# Patient Record
Sex: MALE | Race: WHITE | NOT HISPANIC OR LATINO | URBAN - METROPOLITAN AREA
[De-identification: names, ages, dates, MRNs, and addresses within clinical notes are randomized per-mention and may not be internally consistent; named-entity substitution may affect disease eponyms.]

---

## 2019-10-27 ENCOUNTER — HOSPITAL ENCOUNTER (INPATIENT)
Facility: HOSPITAL | Age: 44
LOS: 2 days | Discharge: LEFT BEFORE DISCHARGE | DRG: 304 | End: 2019-10-30
Attending: EMERGENCY MEDICINE | Admitting: EMERGENCY MEDICINE
Payer: MEDICARE

## 2019-10-27 DIAGNOSIS — R07.9 CHEST PAIN: ICD-10-CM

## 2019-10-27 DIAGNOSIS — R07.9 CHEST PAIN, UNSPECIFIED TYPE: Primary | ICD-10-CM

## 2019-10-27 DIAGNOSIS — N18.6 ESRD (END STAGE RENAL DISEASE): ICD-10-CM

## 2019-10-27 DIAGNOSIS — D64.9 ANEMIA, UNSPECIFIED TYPE: ICD-10-CM

## 2019-10-27 DIAGNOSIS — I10 HYPERTENSION, UNSPECIFIED TYPE: ICD-10-CM

## 2019-10-27 DIAGNOSIS — I16.0 HYPERTENSIVE URGENCY: ICD-10-CM

## 2019-10-27 DIAGNOSIS — D63.8 ANEMIA OF CHRONIC DISEASE: ICD-10-CM

## 2019-10-27 PROBLEM — E11.9 DIABETES MELLITUS, TYPE II: Status: ACTIVE | Noted: 2019-10-27

## 2019-10-27 PROBLEM — K31.84 GASTROPARESIS: Status: ACTIVE | Noted: 2019-10-27

## 2019-10-27 LAB
ABO + RH BLD: NORMAL
ALBUMIN SERPL BCP-MCNC: 3.6 G/DL (ref 3.5–5.2)
ALP SERPL-CCNC: 58 U/L (ref 55–135)
ALT SERPL W/O P-5'-P-CCNC: 6 U/L (ref 10–44)
ANION GAP SERPL CALC-SCNC: 17 MMOL/L (ref 8–16)
AST SERPL-CCNC: 10 U/L (ref 10–40)
BASOPHILS # BLD AUTO: 0.03 K/UL (ref 0–0.2)
BASOPHILS NFR BLD: 0.5 % (ref 0–1.9)
BILIRUB SERPL-MCNC: 0.8 MG/DL (ref 0.1–1)
BLD GP AB SCN CELLS X3 SERPL QL: NORMAL
BLD PROD TYP BPU: NORMAL
BLOOD UNIT EXPIRATION DATE: NORMAL
BLOOD UNIT TYPE CODE: 6200
BLOOD UNIT TYPE: NORMAL
BNP SERPL-MCNC: 2734 PG/ML (ref 0–99)
BUN SERPL-MCNC: 51 MG/DL (ref 6–20)
CALCIUM SERPL-MCNC: 9.3 MG/DL (ref 8.7–10.5)
CHLORIDE SERPL-SCNC: 97 MMOL/L (ref 95–110)
CO2 SERPL-SCNC: 24 MMOL/L (ref 23–29)
CODING SYSTEM: NORMAL
CREAT SERPL-MCNC: 10 MG/DL (ref 0.5–1.4)
DIFFERENTIAL METHOD: ABNORMAL
DISPENSE STATUS: NORMAL
EOSINOPHIL # BLD AUTO: 0.3 K/UL (ref 0–0.5)
EOSINOPHIL NFR BLD: 5.9 % (ref 0–8)
ERYTHROCYTE [DISTWIDTH] IN BLOOD BY AUTOMATED COUNT: 13.9 % (ref 11.5–14.5)
EST. GFR  (AFRICAN AMERICAN): 7 ML/MIN/1.73 M^2
EST. GFR  (NON AFRICAN AMERICAN): 6 ML/MIN/1.73 M^2
GLUCOSE SERPL-MCNC: 240 MG/DL (ref 70–110)
HCT VFR BLD AUTO: 22.1 % (ref 40–54)
HGB BLD-MCNC: 7.3 G/DL (ref 14–18)
IMM GRANULOCYTES # BLD AUTO: 0.02 K/UL (ref 0–0.04)
IMM GRANULOCYTES NFR BLD AUTO: 0.4 % (ref 0–0.5)
LIPASE SERPL-CCNC: 47 U/L (ref 4–60)
LYMPHOCYTES # BLD AUTO: 0.9 K/UL (ref 1–4.8)
LYMPHOCYTES NFR BLD: 15.5 % (ref 18–48)
MCH RBC QN AUTO: 29.4 PG (ref 27–31)
MCHC RBC AUTO-ENTMCNC: 33 G/DL (ref 32–36)
MCV RBC AUTO: 89 FL (ref 82–98)
MONOCYTES # BLD AUTO: 0.3 K/UL (ref 0.3–1)
MONOCYTES NFR BLD: 5.3 % (ref 4–15)
NEUTROPHILS # BLD AUTO: 4 K/UL (ref 1.8–7.7)
NEUTROPHILS NFR BLD: 72.4 % (ref 38–73)
NRBC BLD-RTO: 0 /100 WBC
NUM UNITS TRANS PACKED RBC: NORMAL
PLATELET # BLD AUTO: 140 K/UL (ref 150–350)
PMV BLD AUTO: 10 FL (ref 9.2–12.9)
POCT GLUCOSE: 220 MG/DL (ref 70–110)
POTASSIUM SERPL-SCNC: 4.4 MMOL/L (ref 3.5–5.1)
PROT SERPL-MCNC: 7.1 G/DL (ref 6–8.4)
RBC # BLD AUTO: 2.48 M/UL (ref 4.6–6.2)
SODIUM SERPL-SCNC: 138 MMOL/L (ref 136–145)
TROPONIN I SERPL DL<=0.01 NG/ML-MCNC: 0.1 NG/ML (ref 0–0.03)
WBC # BLD AUTO: 5.47 K/UL (ref 3.9–12.7)

## 2019-10-27 PROCEDURE — 25000003 PHARM REV CODE 250: Performed by: EMERGENCY MEDICINE

## 2019-10-27 PROCEDURE — 80053 COMPREHEN METABOLIC PANEL: CPT

## 2019-10-27 PROCEDURE — P9016 RBC LEUKOCYTES REDUCED: HCPCS

## 2019-10-27 PROCEDURE — 96375 TX/PRO/DX INJ NEW DRUG ADDON: CPT

## 2019-10-27 PROCEDURE — 94761 N-INVAS EAR/PLS OXIMETRY MLT: CPT

## 2019-10-27 PROCEDURE — 99204 PR OFFICE/OUTPT VISIT, NEW, LEVL IV, 45-59 MIN: ICD-10-PCS | Mod: ,,, | Performed by: INTERNAL MEDICINE

## 2019-10-27 PROCEDURE — 85025 COMPLETE CBC W/AUTO DIFF WBC: CPT

## 2019-10-27 PROCEDURE — 63600175 PHARM REV CODE 636 W HCPCS: Performed by: NURSE PRACTITIONER

## 2019-10-27 PROCEDURE — 93010 ELECTROCARDIOGRAM REPORT: CPT | Mod: ,,, | Performed by: INTERNAL MEDICINE

## 2019-10-27 PROCEDURE — G0378 HOSPITAL OBSERVATION PER HR: HCPCS

## 2019-10-27 PROCEDURE — 83690 ASSAY OF LIPASE: CPT

## 2019-10-27 PROCEDURE — 99204 OFFICE O/P NEW MOD 45 MIN: CPT | Mod: ,,, | Performed by: INTERNAL MEDICINE

## 2019-10-27 PROCEDURE — 86920 COMPATIBILITY TEST SPIN: CPT

## 2019-10-27 PROCEDURE — 83880 ASSAY OF NATRIURETIC PEPTIDE: CPT

## 2019-10-27 PROCEDURE — 93005 ELECTROCARDIOGRAM TRACING: CPT

## 2019-10-27 PROCEDURE — 93010 EKG 12-LEAD: ICD-10-PCS | Mod: ,,, | Performed by: INTERNAL MEDICINE

## 2019-10-27 PROCEDURE — 96372 THER/PROPH/DIAG INJ SC/IM: CPT

## 2019-10-27 PROCEDURE — 84484 ASSAY OF TROPONIN QUANT: CPT

## 2019-10-27 PROCEDURE — 99291 CRITICAL CARE FIRST HOUR: CPT | Mod: 25

## 2019-10-27 PROCEDURE — 63600175 PHARM REV CODE 636 W HCPCS: Performed by: EMERGENCY MEDICINE

## 2019-10-27 PROCEDURE — 36430 TRANSFUSION BLD/BLD COMPNT: CPT

## 2019-10-27 PROCEDURE — 25000003 PHARM REV CODE 250: Performed by: NURSE PRACTITIONER

## 2019-10-27 PROCEDURE — 86850 RBC ANTIBODY SCREEN: CPT

## 2019-10-27 PROCEDURE — S0028 INJECTION, FAMOTIDINE, 20 MG: HCPCS | Performed by: NURSE PRACTITIONER

## 2019-10-27 PROCEDURE — 96376 TX/PRO/DX INJ SAME DRUG ADON: CPT

## 2019-10-27 RX ORDER — TRAMADOL HYDROCHLORIDE 50 MG/1
50 TABLET ORAL EVERY 6 HOURS PRN
Status: DISCONTINUED | OUTPATIENT
Start: 2019-10-27 | End: 2019-10-30 | Stop reason: HOSPADM

## 2019-10-27 RX ORDER — HYDRALAZINE HYDROCHLORIDE 20 MG/ML
10 INJECTION INTRAMUSCULAR; INTRAVENOUS EVERY 6 HOURS PRN
Status: DISCONTINUED | OUTPATIENT
Start: 2019-10-27 | End: 2019-10-30 | Stop reason: HOSPADM

## 2019-10-27 RX ORDER — IBUPROFEN 200 MG
16 TABLET ORAL
Status: DISCONTINUED | OUTPATIENT
Start: 2019-10-27 | End: 2019-10-27

## 2019-10-27 RX ORDER — HEPARIN SODIUM 5000 [USP'U]/ML
5000 INJECTION, SOLUTION INTRAVENOUS; SUBCUTANEOUS EVERY 8 HOURS
Status: DISCONTINUED | OUTPATIENT
Start: 2019-10-27 | End: 2019-10-30 | Stop reason: HOSPADM

## 2019-10-27 RX ORDER — ONDANSETRON 2 MG/ML
4 INJECTION INTRAMUSCULAR; INTRAVENOUS EVERY 8 HOURS PRN
Status: DISCONTINUED | OUTPATIENT
Start: 2019-10-27 | End: 2019-10-30 | Stop reason: HOSPADM

## 2019-10-27 RX ORDER — GLUCAGON 1 MG
1 KIT INJECTION
Status: DISCONTINUED | OUTPATIENT
Start: 2019-10-27 | End: 2019-10-30 | Stop reason: HOSPADM

## 2019-10-27 RX ORDER — METOPROLOL TARTRATE 1 MG/ML
5 INJECTION, SOLUTION INTRAVENOUS
Status: COMPLETED | OUTPATIENT
Start: 2019-10-27 | End: 2019-10-27

## 2019-10-27 RX ORDER — NITROGLYCERIN 0.4 MG/1
0.4 TABLET SUBLINGUAL EVERY 5 MIN PRN
Status: DISCONTINUED | OUTPATIENT
Start: 2019-10-27 | End: 2019-10-30 | Stop reason: HOSPADM

## 2019-10-27 RX ORDER — GLUCAGON 1 MG
1 KIT INJECTION
Status: DISCONTINUED | OUTPATIENT
Start: 2019-10-27 | End: 2019-10-27

## 2019-10-27 RX ORDER — INSULIN ASPART 100 [IU]/ML
0-5 INJECTION, SOLUTION INTRAVENOUS; SUBCUTANEOUS
Status: DISCONTINUED | OUTPATIENT
Start: 2019-10-27 | End: 2019-10-27

## 2019-10-27 RX ORDER — AMLODIPINE BESYLATE 5 MG/1
5 TABLET ORAL DAILY
Status: DISCONTINUED | OUTPATIENT
Start: 2019-10-27 | End: 2019-10-29

## 2019-10-27 RX ORDER — CARVEDILOL 3.12 MG/1
3.12 TABLET ORAL 2 TIMES DAILY
Status: DISCONTINUED | OUTPATIENT
Start: 2019-10-27 | End: 2019-10-28

## 2019-10-27 RX ORDER — INSULIN ASPART 100 [IU]/ML
0-5 INJECTION, SOLUTION INTRAVENOUS; SUBCUTANEOUS EVERY 6 HOURS PRN
Status: DISCONTINUED | OUTPATIENT
Start: 2019-10-27 | End: 2019-10-30 | Stop reason: HOSPADM

## 2019-10-27 RX ORDER — ASPIRIN 325 MG
325 TABLET ORAL
Status: COMPLETED | OUTPATIENT
Start: 2019-10-27 | End: 2019-10-27

## 2019-10-27 RX ORDER — HYDRALAZINE HYDROCHLORIDE 20 MG/ML
10 INJECTION INTRAMUSCULAR; INTRAVENOUS
Status: COMPLETED | OUTPATIENT
Start: 2019-10-27 | End: 2019-10-27

## 2019-10-27 RX ORDER — HYDRALAZINE HYDROCHLORIDE 25 MG/1
25 TABLET, FILM COATED ORAL EVERY 8 HOURS
Status: DISCONTINUED | OUTPATIENT
Start: 2019-10-27 | End: 2019-10-28

## 2019-10-27 RX ORDER — FAMOTIDINE 20 MG/50ML
20 INJECTION, SOLUTION INTRAVENOUS DAILY
Status: DISCONTINUED | OUTPATIENT
Start: 2019-10-27 | End: 2019-10-28

## 2019-10-27 RX ORDER — IBUPROFEN 200 MG
24 TABLET ORAL
Status: DISCONTINUED | OUTPATIENT
Start: 2019-10-27 | End: 2019-10-27

## 2019-10-27 RX ORDER — HYDROCODONE BITARTRATE AND ACETAMINOPHEN 500; 5 MG/1; MG/1
TABLET ORAL
Status: DISCONTINUED | OUTPATIENT
Start: 2019-10-27 | End: 2019-10-29

## 2019-10-27 RX ORDER — ONDANSETRON 2 MG/ML
4 INJECTION INTRAMUSCULAR; INTRAVENOUS
Status: COMPLETED | OUTPATIENT
Start: 2019-10-27 | End: 2019-10-27

## 2019-10-27 RX ORDER — ACETAMINOPHEN 325 MG/1
650 TABLET ORAL EVERY 6 HOURS PRN
Status: DISCONTINUED | OUTPATIENT
Start: 2019-10-27 | End: 2019-10-30 | Stop reason: HOSPADM

## 2019-10-27 RX ORDER — ASPIRIN 81 MG/1
81 TABLET ORAL DAILY
Status: DISCONTINUED | OUTPATIENT
Start: 2019-10-28 | End: 2019-10-30 | Stop reason: HOSPADM

## 2019-10-27 RX ORDER — ATORVASTATIN CALCIUM 40 MG/1
40 TABLET, FILM COATED ORAL NIGHTLY
Status: DISCONTINUED | OUTPATIENT
Start: 2019-10-27 | End: 2019-10-28

## 2019-10-27 RX ADMIN — FAMOTIDINE 20 MG: 20 INJECTION, SOLUTION INTRAVENOUS at 02:10

## 2019-10-27 RX ADMIN — HYDRALAZINE HYDROCHLORIDE 10 MG: 20 INJECTION INTRAMUSCULAR; INTRAVENOUS at 11:10

## 2019-10-27 RX ADMIN — ATORVASTATIN CALCIUM 40 MG: 40 TABLET, FILM COATED ORAL at 09:10

## 2019-10-27 RX ADMIN — ONDANSETRON 4 MG: 2 INJECTION INTRAMUSCULAR; INTRAVENOUS at 06:10

## 2019-10-27 RX ADMIN — AMLODIPINE BESYLATE 5 MG: 5 TABLET ORAL at 01:10

## 2019-10-27 RX ADMIN — CARVEDILOL 3.12 MG: 3.12 TABLET, FILM COATED ORAL at 09:10

## 2019-10-27 RX ADMIN — ASPIRIN 325 MG ORAL TABLET 325 MG: 325 PILL ORAL at 11:10

## 2019-10-27 RX ADMIN — HYDRALAZINE HYDROCHLORIDE 25 MG: 25 TABLET, FILM COATED ORAL at 09:10

## 2019-10-27 RX ADMIN — NITROGLYCERIN 0.4 MG: 0.4 TABLET, ORALLY DISINTEGRATING SUBLINGUAL at 09:10

## 2019-10-27 RX ADMIN — CARVEDILOL 3.12 MG: 3.12 TABLET, FILM COATED ORAL at 01:10

## 2019-10-27 RX ADMIN — HYDRALAZINE HYDROCHLORIDE 25 MG: 25 TABLET, FILM COATED ORAL at 01:10

## 2019-10-27 RX ADMIN — TRAMADOL HYDROCHLORIDE 50 MG: 50 TABLET, FILM COATED ORAL at 06:10

## 2019-10-27 RX ADMIN — ONDANSETRON 4 MG: 2 INJECTION INTRAMUSCULAR; INTRAVENOUS at 11:10

## 2019-10-27 RX ADMIN — NITROGLYCERIN 1 INCH: 20 OINTMENT TOPICAL at 11:10

## 2019-10-27 RX ADMIN — INSULIN ASPART 1 UNITS: 100 INJECTION, SOLUTION INTRAVENOUS; SUBCUTANEOUS at 11:10

## 2019-10-27 RX ADMIN — METOPROLOL TARTRATE 5 MG: 5 INJECTION INTRAVENOUS at 01:10

## 2019-10-27 NOTE — ASSESSMENT & PLAN NOTE
Serial troponins 0.099,   Serial cardiac enzymes, continue Coreg   Nitrates prn, Oxygen therapy to maintain sats > 92 %   2 D ECHO pending  Could be having chest pain due to hypertensive urgency

## 2019-10-27 NOTE — NURSING
Despite education on the importance of lab-work, patient refuses lab draw.     Despite education on the importance of SQ heparin, patient refuses injection.

## 2019-10-27 NOTE — ASSESSMENT & PLAN NOTE
Patient on dialyzes on TTS schedule at [facility].     No dialysis indicated today. Will evaluate for HD need on daily basis.    Access: left arm AVF.

## 2019-10-27 NOTE — ASSESSMENT & PLAN NOTE
Pt has been vomiting x 2 days  Keep NPO for now  Prn Zofran  accuchecks every 6 hours  Sliding scale insulin

## 2019-10-27 NOTE — ASSESSMENT & PLAN NOTE
Likely anemia of chronic disease related to ESRD  Type /cross match and transfuse 1 unit PrBCs  Monitor for hematochezia, hematemesis and melena  Serial H & H every 8 hours

## 2019-10-27 NOTE — H&P
"[facility] Hospital Medicine  History & Physical    Patient Name: Brody Robins  MRN: [id number]  Admission Date: 10/27/2019  Attending Physician: Rudy Phan MD   Primary Care Provider: Primary Doctor No         Patient information was obtained from patient, past medical records and ER records.     Subjective:     Principal Problem:Chest pain    Chief Complaint:   Chief Complaint   Patient presents with    Chest Pain     began yesterday afternoon; SOB, vomiting; dialysis MWF        HPI: Pt is a 43 yo male with PMHx of ESRD on HD (dialyzes M,W,F), Diabetes, gastroparesis and CAD. He states he had placement of a coronary stent in July, 2019 and a recent heart cath approx 4 months. The pt states yesterday he started vomiting multiple episodes of greenish emesis. He is able to take his medications but has vomited everything. Also, pt describes chest pain described as stabbing and rates "8" (1-10) that radiates into his left arm and left leg. He denies any SOB but has noted some dizziness and lightheadedness. He denies fever, chills and states he has "diabetic stomach."  He is visiting here and last had dialysis on Friday at [facility] in [place]. Vital signs on arrival Temp 98.4, pulse 97, resp 18, B/P 213/101 and SpO2 = 100 %. Labs show Hgb 7.3/Hct 22.1, platelets 140, anion gap 17, BUN 51, creatinine 10.0, glucose 240, BNP = 2.734 and troponin 0.099. CXR shows Mild coarsening of bronchovascular markings/COPD.  Also, enlarged cardiac silhouette. Pt is placed on Observation due to anemia and further evaluation of chest pain and likely gastroparesis.       Medical History - CAD, DM, gastroparesis and HTN, ESRD    Surgical History - Cardiac stent x 1, right BKA     Review of patient's allergies indicates:   Allergen Reactions    Morphine        No current facility-administered medications on file prior to encounter.      No current outpatient medications on file prior to encounter.     Family History  "    Reviewed and not pertinent        Tobacco Use    Smoking status: Not on file   Substance and Sexual Activity    Alcohol use: Not on file    Drug use: Not on file    Sexual activity: Not on file     Review of Systems   Constitutional: Positive for activity change and appetite change. Negative for chills, diaphoresis, fatigue and fever.   HENT: Negative.    Eyes:        Ptosis   Respiratory: Negative for cough and shortness of breath.    Cardiovascular: Positive for chest pain. Negative for leg swelling.   Gastrointestinal: Positive for abdominal pain, nausea and vomiting.   Genitourinary: Positive for decreased urine volume.   Musculoskeletal: Positive for myalgias.        Amputation to right leg  Ambulates with use of rollator   Skin: Negative for pallor, rash and wound.   Neurological: Positive for dizziness and light-headedness.   Psychiatric/Behavioral:        Pt not cooperative when asked about his home meds stating his wife cannot bring because she is at work and he was not agreeable to call her     Objective:     Vital Signs (Most Recent):  Temp: 98.7 °F (37.1 °C) (10/27/19 1232)  Pulse: 97 (10/27/19 1232)  Resp: 17 (10/27/19 1232)  BP: (!) 205/100 (10/27/19 1232)  SpO2: 96 % (10/27/19 1232) Vital Signs (24h Range):  Temp:  [98.4 °F (36.9 °C)-98.7 °F (37.1 °C)] 98.7 °F (37.1 °C)  Pulse:  [92-97] 97  Resp:  [17-18] 17  SpO2:  [94 %-100 %] 96 %  BP: (205-217)/() 205/100     Weight: 103.2 kg (227 lb 8.2 oz)(RLE prosthetic in place)  Body mass index is 33.6 kg/m².    Physical Exam   Constitutional: He is oriented to person, place, and time. He appears well-developed and well-nourished.   Appears chronically ill   HENT:   Head: Normocephalic and atraumatic.   Nose: Nose normal.   Eyes: Pupils are equal, round, and reactive to light. Conjunctivae are normal. No scleral icterus.   Neck: Normal range of motion. Neck supple.   Cardiovascular: Normal rate, regular rhythm and normal heart sounds. Exam  reveals no gallop and no friction rub.   No murmur heard.  Pulmonary/Chest: Effort normal and breath sounds normal.   Abdominal: Soft. Bowel sounds are normal. There is no tenderness.   obese   Musculoskeletal: He exhibits no edema or tenderness.   Below knee amputation to the right leg  Uses rollator to assist with walking   Neurological: He is alert and oriented to person, place, and time.   Skin: Skin is warm and dry.   Psychiatric: He has a normal mood and affect. His speech is normal and behavior is normal. Thought content normal.   Vitals reviewed.        CRANIAL NERVES     CN III, IV, VI   Pupils are equal, round, and reactive to light.       Significant Labs:   CBC:   Recent Labs   Lab 10/27/19  1102   WBC 5.47   HGB 7.3*   HCT 22.1*   *     CMP:   Recent Labs   Lab 10/27/19  1102      K 4.4   CL 97   CO2 24   *   BUN 51*   CREATININE 10.0*   CALCIUM 9.3   PROT 7.1   ALBUMIN 3.6   BILITOT 0.8   ALKPHOS 58   AST 10   ALT 6*   ANIONGAP 17*   EGFRNONAA 6*     Cardiac Markers:   Recent Labs   Lab 10/27/19  1102   BNP 2,734*     All pertinent labs within the past 24 hours have been reviewed.    Significant Imaging: I have reviewed all pertinent imaging results/findings within the past 24 hours.    Assessment/Plan:     * Chest pain    Serial troponins 0.099,   Serial cardiac enzymes, continue Coreg   Nitrates prn, Oxygen therapy to maintain sats > 92 %   2 D ECHO pending  Could be having chest pain due to hypertensive urgency      Hypertensive urgency  Has been unable to take antihypertensive due to vomiting x 2 days  Likely contributing to chest pain  Pt stated what his blood pressure meds were but did not know any of his dosages  He said his wife would not bring to the hospital  Amlodipine, Coreg and hydralazine ordered  Prn hydralazine or lopressor for uncontrolled blood pressure      Gastroparesis  Prn analgesia  Limit prn opiate analgesia due to contraindication - slow of GI motility  Prn  antiemetics      Anemia of chronic disease  Likely anemia of chronic disease related to ESRD  Type /cross match and transfuse 1 unit PrBCs  Monitor for hematochezia, hematemesis and melena  Serial H & H every 8 hours        Diabetes mellitus, type II  Pt has been vomiting x 2 days  Keep NPO for now  Prn Zofran  accuchecks every 6 hours  Sliding scale insulin      ESRD (end stage renal disease)  Nephrology consult - to resume HD  Has access to left upper arm - + thrill, + bruit  Last dialyzed on Friday, 10/25 at [facility]        VTE Risk Mitigation (From admission, onward)    None             Stacy Castro, NP  Department of Hospital Medicine   [facility]

## 2019-10-27 NOTE — ASSESSMENT & PLAN NOTE
Nephrology consult - to resume HD  Has access to left upper arm - + thrill, + bruit  Last dialyzed on Friday, 10/25 at [facility]

## 2019-10-27 NOTE — SUBJECTIVE & OBJECTIVE
No past medical history on file.    No past surgical history on file.    Review of patient's allergies indicates:   Allergen Reactions    Morphine        No current facility-administered medications on file prior to encounter.      No current outpatient medications on file prior to encounter.     Family History     None        Tobacco Use    Smoking status: Not on file   Substance and Sexual Activity    Alcohol use: Not on file    Drug use: Not on file    Sexual activity: Not on file     Review of Systems   Constitutional: Positive for activity change and appetite change. Negative for chills, diaphoresis, fatigue and fever.   HENT: Negative.    Eyes:        Ptosis   Respiratory: Negative for cough and shortness of breath.    Cardiovascular: Positive for chest pain. Negative for leg swelling.   Gastrointestinal: Positive for abdominal pain, nausea and vomiting.   Genitourinary: Positive for decreased urine volume.   Musculoskeletal: Positive for myalgias.        Amputation to right leg  Ambulates with use of rollator   Skin: Negative for pallor, rash and wound.   Neurological: Positive for dizziness and light-headedness.   Psychiatric/Behavioral:        Pt not cooperative when asked about his home meds stating his wife cannot bring because she is at work and he was not agreeable to call her     Objective:     Vital Signs (Most Recent):  Temp: 98.7 °F (37.1 °C) (10/27/19 1232)  Pulse: 97 (10/27/19 1232)  Resp: 17 (10/27/19 1232)  BP: (!) 205/100 (10/27/19 1232)  SpO2: 96 % (10/27/19 1232) Vital Signs (24h Range):  Temp:  [98.4 °F (36.9 °C)-98.7 °F (37.1 °C)] 98.7 °F (37.1 °C)  Pulse:  [92-97] 97  Resp:  [17-18] 17  SpO2:  [94 %-100 %] 96 %  BP: (205-217)/() 205/100     Weight: 103.2 kg (227 lb 8.2 oz)(RLE prosthetic in place)  Body mass index is 33.6 kg/m².    Physical Exam   Constitutional: He is oriented to person, place, and time. He appears well-developed and well-nourished.   Appears chronically ill    HENT:   Head: Normocephalic and atraumatic.   Nose: Nose normal.   Eyes: Pupils are equal, round, and reactive to light. Conjunctivae are normal. No scleral icterus.   Neck: Normal range of motion. Neck supple.   Cardiovascular: Normal rate, regular rhythm and normal heart sounds. Exam reveals no gallop and no friction rub.   No murmur heard.  Pulmonary/Chest: Effort normal and breath sounds normal.   Abdominal: Soft. Bowel sounds are normal. There is no tenderness.   obese   Musculoskeletal: He exhibits no edema or tenderness.   Below knee amputation to the right leg  Uses rollator to assist with walking   Neurological: He is alert and oriented to person, place, and time.   Skin: Skin is warm and dry.   Psychiatric: He has a normal mood and affect. His speech is normal and behavior is normal. Thought content normal.   Vitals reviewed.        CRANIAL NERVES     CN III, IV, VI   Pupils are equal, round, and reactive to light.       Significant Labs:   CBC:   Recent Labs   Lab 10/27/19  1102   WBC 5.47   HGB 7.3*   HCT 22.1*   *     CMP:   Recent Labs   Lab 10/27/19  1102      K 4.4   CL 97   CO2 24   *   BUN 51*   CREATININE 10.0*   CALCIUM 9.3   PROT 7.1   ALBUMIN 3.6   BILITOT 0.8   ALKPHOS 58   AST 10   ALT 6*   ANIONGAP 17*   EGFRNONAA 6*     Cardiac Markers:   Recent Labs   Lab 10/27/19  1102   BNP 2,734*     All pertinent labs within the past 24 hours have been reviewed.    Significant Imaging: I have reviewed all pertinent imaging results/findings within the past 24 hours.

## 2019-10-27 NOTE — NURSING
Patient to room visa stretcher from ER. No distress noted. Patient oriented to room and call light in reach. Instructed to call for assistance when ambulating. Heart monitor applied and verified.

## 2019-10-27 NOTE — CONSULTS
[facility]   Nephrology  Consult Note          Patient Name: Brody Robins  MRN: [id number]  Admission Date: 10/27/2019  Hospital Length of Stay: 0 days  Attending Provider: Rudy Phan MD   Primary Care Physician: Primary Doctor No  Principal Problem:Chest pain    Consults  Subjective:     HPI: 44 year old male with ESRD, anemia, hyperparathyroidism, DM2, gastroparesis, CAD, HTN presents to [facility] with vomiting and chest pain.     Nephrology was consulted to help with patient's renal care while he is admitted at [facility]. I saw and examined patient in his hospital room. Patient reports that he experienced nausea/vomiting and chest pain on day of admission. He also reports chronic abdominal pain due to gastroparesis. He reports right BKA due to diabetic complications.     Patient dialyzes on schedule TTS at [place], [place] under care of Dr. Adriana Saavedra. Access: left arm AVF.  He was last dialyzed at [facility] on Friday (patient is visiting from [place], [place]).       No past medical history on file.    No past surgical history on file.    Review of patient's allergies indicates:   Allergen Reactions    Morphine      Current Facility-Administered Medications   Medication Frequency    0.9%  NaCl infusion (for blood administration) Q24H PRN    acetaminophen tablet 650 mg Q6H PRN    amLODIPine tablet 5 mg Daily    [START ON 10/28/2019] aspirin EC tablet 81 mg Daily    atorvastatin tablet 40 mg QHS    carvedilol tablet 3.125 mg BID    dextrose 50% injection 12.5 g PRN    famotidine IVPB 20 mg Daily    glucagon (human recombinant) injection 1 mg PRN    heparin (porcine) injection 5,000 Units Q8H    hydrALAZINE injection 10 mg Q6H PRN    hydrALAZINE tablet 25 mg Q8H    insulin aspart U-100 pen 0-5 Units Q6H PRN    ondansetron injection 4 mg Q8H PRN    promethazine (PHENERGAN) 6.25 mg in dextrose 5 % 50 mL IVPB Q6H PRN    traMADol tablet 50 mg Q6H PRN     Family History      None        Tobacco Use    Smoking status: Not on file   Substance and Sexual Activity    Alcohol use: Not on file    Drug use: Not on file    Sexual activity: Not on file     Review of Systems   Constitutional: Negative for fever.   HENT: Negative for congestion.    Eyes: Negative for visual disturbance.   Respiratory: Negative for cough, shortness of breath and wheezing.    Cardiovascular: Positive for chest pain. Negative for palpitations.   Gastrointestinal: Positive for abdominal pain, nausea and vomiting. Negative for diarrhea.   Genitourinary: Negative for difficulty urinating and dysuria.   Musculoskeletal: Negative for joint swelling.   Skin: Negative for rash.   Neurological: Negative for weakness and headaches.     Objective:     Vital Signs (Most Recent):  Temp: 98.7 °F (37.1 °C) (10/27/19 1232)  Pulse: 97 (10/27/19 1232)  Resp: 17 (10/27/19 1232)  BP: (!) 189/89 (10/27/19 1310)  SpO2: 96 % (10/27/19 1232)  O2 Device (Oxygen Therapy): room air (10/27/19 1114) Vital Signs (24h Range):  Temp:  [98.4 °F (36.9 °C)-98.7 °F (37.1 °C)] 98.7 °F (37.1 °C)  Pulse:  [92-97] 97  Resp:  [17-18] 17  SpO2:  [94 %-100 %] 96 %  BP: (189-217)/() 189/89     Weight: 103.2 kg (227 lb 8.2 oz)(RLE prosthetic in place) (10/27/19 1011)  Body mass index is 33.6 kg/m².  Body surface area is 2.24 meters squared.    No intake/output data recorded.    Physical Exam   Constitutional: He is oriented to person, place, and time. He appears well-developed. He is cooperative.   HENT:   Head: Normocephalic.   Nose: No rhinorrhea.   Mouth/Throat: Mucous membranes are normal. No oropharyngeal exudate.   Neck: No thyroid mass present.   Cardiovascular: Normal rate, regular rhythm, S1 normal, S2 normal and intact distal pulses.   Pulmonary/Chest: Effort normal. No respiratory distress. He has no wheezes.   Abdominal: Soft. Bowel sounds are normal. He exhibits no distension. There is tenderness. No hernia.   Musculoskeletal: He  exhibits edema.   S/p right BKA.   Trace Left LE edema.    Lymphadenopathy:     He has no cervical adenopathy.   Neurological: He is alert and oriented to person, place, and time.   Skin: Skin is warm and dry. Rash: .lastre.   Psychiatric: He has a normal mood and affect.       Significant Labs:  Lab Results   Component Value Date    CREATININE 10.0 (H) 10/27/2019    BUN 51 (H) 10/27/2019     10/27/2019    K 4.4 10/27/2019    CL 97 10/27/2019    CO2 24 10/27/2019     Lab Results   Component Value Date    CALCIUM 9.3 10/27/2019    PHOS 3.3 10/11/2009     Lab Results   Component Value Date    ALBUMIN 3.6 10/27/2019     Lab Results   Component Value Date    WBC 5.47 10/27/2019    HGB 7.3 (L) 10/27/2019    HCT 22.1 (L) 10/27/2019    MCV 89 10/27/2019     (L) 10/27/2019       No results for input(s): MG in the last 168 hours.      Significant Imaging:  Imaging Results          X-Ray Chest AP Portable (Final result)  Result time 10/27/19 11:19:22    Final result by Eh Salvador MD (10/27/19 11:19:22)                 Impression:      No acute cardiopulmonary disease.      Electronically signed by: Eh Salvador MD  Date:    10/27/2019  Time:    11:19             Narrative:    EXAMINATION:  XR CHEST AP PORTABLE    CLINICAL HISTORY:  Chest Pain;    TECHNIQUE:  Single frontal view of the chest was performed.    COMPARISON:  None    FINDINGS:  Mild coarsening of bronchovascular markings/COPD.  No pleural effusion or pneumothorax.    The cardiac silhouette is enlarged.  The hilar and mediastinal contours are unremarkable.    Mild thoracic scoliotic curvature convex right with multilevel degenerative changes.                                  Assessment/Plan:     * Chest pain  As per hospitalist/Cardiology.     Anemia of chronic disease  Patient will receive 1 unit of PRBC today. Will monitor with repeat CBC.     ESRD (end stage renal disease)  Patient on dialyzes on TTS schedule at [facility].     No dialysis  indicated today. Will evaluate for HD need on daily basis.    Access: left arm AVF.         Thank you for your consult. I will follow-up with patient. Please contact us if you have any additional questions.    Phong Titus MD   Nephrology  [facility]

## 2019-10-27 NOTE — HPI
44 year old male with ESRD, anemia, hyperparathyroidism, DM2, gastroparesis, CAD, HTN presents to [facility] with vomiting and chest pain.     Nephrology was consulted to help with patient's renal care while he is admitted at [facility]. I saw and examined patient in his hospital room. Patient reports that he experienced nausea/vomiting and chest pain on day of admission. He also reports chronic abdominal pain due to gastroparesis. He reports right BKA due to diabetic complications.     Patient dialyzes on schedule TTS at [place], [place] under care of Dr. Adriana Saavedra. Access: left arm AVF.  He was last dialyzed at [facility] on Friday (patient is visiting from [place], [place]).

## 2019-10-27 NOTE — ED PROVIDER NOTES
SCRIBE #1 NOTE: I, Premaaleida Elizalde, am scribing for, and in the presence of, Marquis Stinson MD. I have scribed the entire note.      History      Chief Complaint   Patient presents with    Chest Pain     began yesterday afternoon; SOB, vomiting; dialysis MWF       Review of patient's allergies indicates:   Allergen Reactions    Morphine         HPI   HPI    10/27/2019, 10:17 AM   History obtained from the patient      History of Present Illness: Brody Robins is a 44 y.o. male patient who presents to the Emergency Department for CP which onset gradually yesterday afternoon. Symptoms are constant and moderate in severity. Pt reports the pain radiates to his LUE. No mitigating or exacerbating factors reported. Associated sxs include n/v and SOB. Patient denies any fever, chills, diaphoresis, cough, wheezing, palpitations, leg swelling/pain, diarrhea, abd pain, back pain, dizziness, HA, lightheadedness, extremity weakness/numbness, and all other sxs at this time. No prior txs reported. Pt states he last had dialysis 2 days ago. No further complaints or concerns at this time.       Arrival mode: Personal vehicle      PCP: Primary Doctor No     Past Medical History:  Past medical history reviewed not relevant      Past Surgical History:  Past surgical history reviewed not relevant      Family History:  Family history reviewed not relevant      Social History:  Social History     Tobacco Use    Smoking status: Unknown   Substance and Sexual Activity    Alcohol use: Unknown    Drug use: Unknown    Sexual activity: Unknown       ROS   Review of Systems   Constitutional: Negative for chills, diaphoresis and fever.   HENT: Negative for sore throat.    Respiratory: Positive for shortness of breath. Negative for cough and wheezing.    Cardiovascular: Positive for chest pain (radiates into LUE). Negative for palpitations and leg swelling.   Gastrointestinal: Positive for nausea and vomiting. Negative for  abdominal pain and diarrhea.   Genitourinary: Negative for dysuria.   Musculoskeletal: Negative for back pain.   Skin: Negative for rash.   Neurological: Negative for dizziness, weakness, light-headedness, numbness and headaches.   Hematological: Does not bruise/bleed easily.   All other systems reviewed and are negative.    Physical Exam      Initial Vitals [10/27/19 1011]   BP Pulse Resp Temp SpO2   (!) 213/101 97 18 98.4 °F (36.9 °C) 100 %      MAP       --          Physical Exam  Nursing Notes and Vital Signs Reviewed.  Constitutional: Patient is in no acute distress. Well-developed and well-nourished.  Head: Atraumatic. Normocephalic.  Eyes: PERRL. EOM intact. Conjunctivae are not pale. No scleral icterus.  ENT: Mucous membranes are moist. Oropharynx is clear and symmetric.    Neck: Supple. Full ROM. No lymphadenopathy.  Cardiovascular: Regular rate. Regular rhythm. No murmurs, rubs, or gallops. Distal pulses are 2+ and symmetric. Shunt to LUE, palpable thrill.   Pulmonary/Chest: No respiratory distress. Clear to auscultation bilaterally. No wheezing or rales.  Abdominal: Soft and non-distended.  There is no tenderness.  No rebound, guarding, or rigidity. Good bowel sounds.  Genitourinary: No CVA tenderness  Musculoskeletal: Moves all extremities. R BKA. No obvious deformities. No edema. No calf tenderness.  Skin: Warm and dry.  Neurological:  Alert, awake, and appropriate.  Normal speech.  No acute focal neurological deficits are appreciated.  Psychiatric: Normal affect. Good eye contact. Appropriate in content.    ED Course    Critical Care  Date/Time: 10/27/2019 12:52 PM  Performed by: Marquis Stinson MD  Authorized by: Marquis Stinson MD   Direct patient critical care time: 20 minutes  Additional history critical care time: 5 minutes  Ordering / reviewing critical care time: 5 minutes  Documentation critical care time: 5 minutes  Consulting other physicians critical care time: 5  "minutes  Total critical care time (exclusive of procedural time) : 40 minutes  Critical care time was exclusive of separately billable procedures and treating other patients and teaching time.  Critical care was necessary to treat or prevent imminent or life-threatening deterioration of the following conditions: ESRD.  Critical care was time spent personally by me on the following activities: blood draw for specimens, development of treatment plan with patient or surrogate, discussions with consultants, interpretation of cardiac output measurements, evaluation of patient's response to treatment, examination of patient, obtaining history from patient or surrogate, ordering and performing treatments and interventions, pulse oximetry, re-evaluation of patient's condition, review of old charts, ordering and review of laboratory studies and ordering and review of radiographic studies.        ED Vital Signs:  Vitals:    10/27/19 1011 10/27/19 1100 10/27/19 1114 10/27/19 1115   BP: (!) 213/101  (S) (!) 217/104 (!) 217/104   Pulse: 97 93 93    Resp: 18      Temp: 98.4 °F (36.9 °C)      TempSrc: Oral      SpO2: 100%  (!) 94%    Weight: 103.2 kg (227 lb 8.2 oz)      Height:        10/27/19 1116 10/27/19 1231 10/27/19 1232 10/27/19 1310   BP: (!) 211/98  (!) 205/100 (!) 189/89   Pulse: 92  97    Resp:   17    Temp:   98.7 °F (37.1 °C)    TempSrc:   Oral    SpO2: 95%  96%    Weight:       Height:  5' 9" (1.753 m)      10/27/19 1315   BP: (!) 191/91   Pulse:    Resp: (!) 166   Temp:    TempSrc:    SpO2: (!) 94%   Weight:    Height:        Abnormal Lab Results:  Labs Reviewed   CBC W/ AUTO DIFFERENTIAL - Abnormal; Notable for the following components:       Result Value    RBC 2.48 (*)     Hemoglobin 7.3 (*)     Hematocrit 22.1 (*)     Platelets 140 (*)     Lymph # 0.9 (*)     Lymph% 15.5 (*)     All other components within normal limits   COMPREHENSIVE METABOLIC PANEL - Abnormal; Notable for the following components:    Glucose " 240 (*)     BUN, Bld 51 (*)     Creatinine 10.0 (*)     ALT 6 (*)     Anion Gap 17 (*)     eGFR if  7 (*)     eGFR if non  6 (*)     All other components within normal limits   TROPONIN I - Abnormal; Notable for the following components:    Troponin I 0.099 (*)     All other components within normal limits   B-TYPE NATRIURETIC PEPTIDE - Abnormal; Notable for the following components:    BNP 2,734 (*)     All other components within normal limits   LIPASE   TROPONIN I   LIPID PANEL   HEMOGLOBIN A1C   PREPARE RBC SOFT        All Lab Results:  Results for orders placed or performed during the hospital encounter of 10/27/19   CBC auto differential   Result Value Ref Range    WBC 5.47 3.90 - 12.70 K/uL    RBC 2.48 (L) 4.60 - 6.20 M/uL    Hemoglobin 7.3 (L) 14.0 - 18.0 g/dL    Hematocrit 22.1 (L) 40.0 - 54.0 %    Mean Corpuscular Volume 89 82 - 98 fL    Mean Corpuscular Hemoglobin 29.4 27.0 - 31.0 pg    Mean Corpuscular Hemoglobin Conc 33.0 32.0 - 36.0 g/dL    RDW 13.9 11.5 - 14.5 %    Platelets 140 (L) 150 - 350 K/uL    MPV 10.0 9.2 - 12.9 fL    Immature Granulocytes 0.4 0.0 - 0.5 %    Gran # (ANC) 4.0 1.8 - 7.7 K/uL    Immature Grans (Abs) 0.02 0.00 - 0.04 K/uL    Lymph # 0.9 (L) 1.0 - 4.8 K/uL    Mono # 0.3 0.3 - 1.0 K/uL    Eos # 0.3 0.0 - 0.5 K/uL    Baso # 0.03 0.00 - 0.20 K/uL    nRBC 0 0 /100 WBC    Gran% 72.4 38.0 - 73.0 %    Lymph% 15.5 (L) 18.0 - 48.0 %    Mono% 5.3 4.0 - 15.0 %    Eosinophil% 5.9 0.0 - 8.0 %    Basophil% 0.5 0.0 - 1.9 %    Differential Method Automated    Comprehensive metabolic panel   Result Value Ref Range    Sodium 138 136 - 145 mmol/L    Potassium 4.4 3.5 - 5.1 mmol/L    Chloride 97 95 - 110 mmol/L    CO2 24 23 - 29 mmol/L    Glucose 240 (H) 70 - 110 mg/dL    BUN, Bld 51 (H) 6 - 20 mg/dL    Creatinine 10.0 (H) 0.5 - 1.4 mg/dL    Calcium 9.3 8.7 - 10.5 mg/dL    Total Protein 7.1 6.0 - 8.4 g/dL    Albumin 3.6 3.5 - 5.2 g/dL    Total Bilirubin 0.8 0.1 - 1.0  mg/dL    Alkaline Phosphatase 58 55 - 135 U/L    AST 10 10 - 40 U/L    ALT 6 (L) 10 - 44 U/L    Anion Gap 17 (H) 8 - 16 mmol/L    eGFR if African American 7 (A) >60 mL/min/1.73 m^2    eGFR if non African American 6 (A) >60 mL/min/1.73 m^2   Troponin I #1   Result Value Ref Range    Troponin I 0.099 (H) 0.000 - 0.026 ng/mL   B-Type natriuretic peptide (BNP)   Result Value Ref Range    BNP 2,734 (H) 0 - 99 pg/mL   Lipase   Result Value Ref Range    Lipase 47 4 - 60 U/L   Type & Screen   Result Value Ref Range    Group & Rh A POS     Indirect Malik NEG          Imaging Results:  Imaging Results          X-Ray Chest AP Portable (Final result)  Result time 10/27/19 11:19:22    Final result by Eh Salvador MD (10/27/19 11:19:22)                 Impression:      No acute cardiopulmonary disease.      Electronically signed by: Eh Salvador MD  Date:    10/27/2019  Time:    11:19             Narrative:    EXAMINATION:  XR CHEST AP PORTABLE    CLINICAL HISTORY:  Chest Pain;    TECHNIQUE:  Single frontal view of the chest was performed.    COMPARISON:  None    FINDINGS:  Mild coarsening of bronchovascular markings/COPD.  No pleural effusion or pneumothorax.    The cardiac silhouette is enlarged.  The hilar and mediastinal contours are unremarkable.    Mild thoracic scoliotic curvature convex right with multilevel degenerative changes.                                    The EKG was ordered, reviewed, and independently interpreted by the ED provider.  Interpretation time: 1018  Rate: 102 BPM  Rhythm: normal sinus rhythm  Interpretation: No acute ST changes. No STEMI.      The Emergency Provider reviewed the vital signs and test results, which are outlined above.    ED Discussion     11:38 AM: Discussed pt's case with Dr. Titus (Nephrology) who recommends transfusion of one unit secondary to CP and anemia.     11:49 AM: Discussed pt's case with Dr. Ambrocio (Cardiology) who recommends admitting pt to observation.    12:09 PM:  Discussed case with Stacy Castro NP ([facility]). Dr. Phan agrees with current care and management of pt and accepts admission.   Admitting Service:   Admitting Physician: Dr. Phan  Admit to: Obs (Tele)    12:09 PM: Re-evaluated pt. I have discussed test results, shared treatment plan, and the need for admission with patient. Pt express understanding at this time and agree with all information. All questions answered. Pt have no further questions or concerns at this time. Pt is ready for admit.      ED Medication(s):  Medications   0.9%  NaCl infusion (for blood administration) (has no administration in time range)   amLODIPine tablet 5 mg (5 mg Oral Given 10/27/19 1352)   carvedilol tablet 3.125 mg (3.125 mg Oral Given 10/27/19 1352)   hydrALAZINE tablet 25 mg (25 mg Oral Given 10/27/19 1352)   acetaminophen tablet 650 mg (has no administration in time range)   ondansetron injection 4 mg (has no administration in time range)   promethazine (PHENERGAN) 6.25 mg in dextrose 5 % 50 mL IVPB (has no administration in time range)   hydrALAZINE injection 10 mg (has no administration in time range)   traMADol tablet 50 mg (has no administration in time range)   aspirin EC tablet 81 mg (has no administration in time range)   atorvastatin tablet 40 mg (has no administration in time range)   dextrose 10% (D10W) Bolus (has no administration in time range)   glucagon (human recombinant) injection 1 mg (has no administration in time range)   insulin aspart U-100 pen 0-5 Units (has no administration in time range)   famotidine IVPB 20 mg (20 mg Intravenous New Bag 10/27/19 1414)   heparin (porcine) injection 5,000 Units (5,000 Units Subcutaneous Not Given 10/27/19 1400)   aspirin tablet 325 mg (325 mg Oral Given 10/27/19 1117)   ondansetron injection 4 mg (4 mg Intravenous Given 10/27/19 1118)   nitroGLYCERIN 2% TD oint ointment 1 inch (1 inch Topical (Top) Given 10/27/19 1120)   hydrALAZINE injection 10 mg (10 mg  Intravenous Given 10/27/19 1119)   metoprolol injection 5 mg (5 mg Intravenous Given 10/27/19 1302)          There are no discharge medications for this patient.        Medical Decision Making    Medical Decision Making:   Clinical Tests:   Lab Tests: Ordered and Reviewed  Radiological Study: Ordered and Reviewed  Medical Tests: Ordered and Reviewed           Scribe Attestation:   Scribe #1: I performed the above scribed service and the documentation accurately describes the services I performed. I attest to the accuracy of the note.    Attending:   Physician Attestation Statement for Scribe #1: I, Marquis Stinson MD, personally performed the services described in this documentation, as scribed by Prema Elizalde, in my presence, and it is both accurate and complete.          Clinical Impression       ICD-10-CM ICD-9-CM   1. Chest pain, unspecified type R07.9 786.50   2. Chest pain R07.9 786.50   3. Anemia, unspecified type D64.9 285.9   4. ESRD (end stage renal disease) N18.6 585.6   5. Hypertension, unspecified type I10 401.9       Disposition:   Disposition: Placed in Observation  Condition: Fair         Marquis Stinson MD  10/27/19 3531

## 2019-10-27 NOTE — HPI
"Pt is a 43 yo male with PMHx of ESRD on HD (dialyzes M,W,F), Diabetes, gastroparesis and CAD. He states he had placement of a coronary stent in July, 2019 and a recent heart cath approx 4 months. The pt states yesterday he started vomiting multiple episodes of greenish emesis. He is able to take his medications but has vomited everything. Also, pt describes chest pain described as stabbing and rates "8" (1-10) that radiates into his left arm and left leg. He denies any SOB but has noted some dizziness and lightheadedness. He denies fever, chills and states he has "diabetic stomach."  He is visiting here and last had dialysis on Friday at [facility] in [place]. Vital signs on arrival Temp 98.4, pulse 97, resp 18, B/P 213/101 and SpO2 = 100 %. Labs show Hgb 7.3/Hct 22.1, platelets 140, anion gap 17, BUN 51, creatinine 10.0, glucose 240, BNP = 2.734 and troponin 0.099. CXR shows Mild coarsening of bronchovascular markings/COPD.  Also, enlarged cardiac silhouette. Pt is placed on Observation due to anemia and further evaluation of chest pain and likely gastroparesis.       "

## 2019-10-27 NOTE — ED NOTES
MD requests attempt to get pts medical records. Pt reports treatments at [facility] in [facility]- [phone]. Facility closed on Sundays. Also reports [facility] is [phone]. Facility closed on Sunday. Pt states treatment at [facility] in [facility]. [phone] hours of operation [phone]. Two unsuccessful attempts. Message left for medical records rep. Primary RN notified.

## 2019-10-27 NOTE — ED NOTES
Pt signed Release of Information sheet and Blood Transfusion consent at this time. Primary RN notified.

## 2019-10-27 NOTE — ASSESSMENT & PLAN NOTE
Admit to Inpatient  Trasnfer to ICU   Tridil switch to Cardene drip for better BP   Serial troponins 0.099, 0.074  Continue Coreg, ACEI and hydralazine   Start Imdur   Nitrates prn, Oxygen therapy to maintain sats > 92 %   2 D ECHO showed normal LVEF and diastolic dysfunction   Could be having chest pain due to hypertensive urgency

## 2019-10-27 NOTE — SUBJECTIVE & OBJECTIVE
No past medical history on file.    No past surgical history on file.    Review of patient's allergies indicates:   Allergen Reactions    Morphine      Current Facility-Administered Medications   Medication Frequency    0.9%  NaCl infusion (for blood administration) Q24H PRN    acetaminophen tablet 650 mg Q6H PRN    amLODIPine tablet 5 mg Daily    [START ON 10/28/2019] aspirin EC tablet 81 mg Daily    atorvastatin tablet 40 mg QHS    carvedilol tablet 3.125 mg BID    dextrose 50% injection 12.5 g PRN    famotidine IVPB 20 mg Daily    glucagon (human recombinant) injection 1 mg PRN    heparin (porcine) injection 5,000 Units Q8H    hydrALAZINE injection 10 mg Q6H PRN    hydrALAZINE tablet 25 mg Q8H    insulin aspart U-100 pen 0-5 Units Q6H PRN    ondansetron injection 4 mg Q8H PRN    promethazine (PHENERGAN) 6.25 mg in dextrose 5 % 50 mL IVPB Q6H PRN    traMADol tablet 50 mg Q6H PRN     Family History     None        Tobacco Use    Smoking status: Not on file   Substance and Sexual Activity    Alcohol use: Not on file    Drug use: Not on file    Sexual activity: Not on file     Review of Systems   Constitutional: Negative for fever.   HENT: Negative for congestion.    Eyes: Negative for visual disturbance.   Respiratory: Negative for cough, shortness of breath and wheezing.    Cardiovascular: Positive for chest pain. Negative for palpitations.   Gastrointestinal: Positive for abdominal pain, nausea and vomiting. Negative for diarrhea.   Genitourinary: Negative for difficulty urinating and dysuria.   Musculoskeletal: Negative for joint swelling.   Skin: Negative for rash.   Neurological: Negative for weakness and headaches.     Objective:     Vital Signs (Most Recent):  Temp: 98.7 °F (37.1 °C) (10/27/19 1232)  Pulse: 97 (10/27/19 1232)  Resp: 17 (10/27/19 1232)  BP: (!) 189/89 (10/27/19 1310)  SpO2: 96 % (10/27/19 1232)  O2 Device (Oxygen Therapy): room air (10/27/19 1114) Vital Signs (24h  Range):  Temp:  [98.4 °F (36.9 °C)-98.7 °F (37.1 °C)] 98.7 °F (37.1 °C)  Pulse:  [92-97] 97  Resp:  [17-18] 17  SpO2:  [94 %-100 %] 96 %  BP: (189-217)/() 189/89     Weight: 103.2 kg (227 lb 8.2 oz)(RLE prosthetic in place) (10/27/19 1011)  Body mass index is 33.6 kg/m².  Body surface area is 2.24 meters squared.    No intake/output data recorded.    Physical Exam   Constitutional: He is oriented to person, place, and time. He appears well-developed. He is cooperative.   HENT:   Head: Normocephalic.   Nose: No rhinorrhea.   Mouth/Throat: Mucous membranes are normal. No oropharyngeal exudate.   Neck: No thyroid mass present.   Cardiovascular: Normal rate, regular rhythm, S1 normal, S2 normal and intact distal pulses.   Pulmonary/Chest: Effort normal. No respiratory distress. He has no wheezes.   Abdominal: Soft. Bowel sounds are normal. He exhibits no distension. There is tenderness. No hernia.   Musculoskeletal: He exhibits edema.   S/p right BKA.   Trace Left LE edema.    Lymphadenopathy:     He has no cervical adenopathy.   Neurological: He is alert and oriented to person, place, and time.   Skin: Skin is warm and dry. Rash: .lastre.   Psychiatric: He has a normal mood and affect.       Significant Labs:  Lab Results   Component Value Date    CREATININE 10.0 (H) 10/27/2019    BUN 51 (H) 10/27/2019     10/27/2019    K 4.4 10/27/2019    CL 97 10/27/2019    CO2 24 10/27/2019     Lab Results   Component Value Date    CALCIUM 9.3 10/27/2019    PHOS 3.3 10/11/2009     Lab Results   Component Value Date    ALBUMIN 3.6 10/27/2019     Lab Results   Component Value Date    WBC 5.47 10/27/2019    HGB 7.3 (L) 10/27/2019    HCT 22.1 (L) 10/27/2019    MCV 89 10/27/2019     (L) 10/27/2019       No results for input(s): MG in the last 168 hours.      Significant Imaging:  Imaging Results          X-Ray Chest AP Portable (Final result)  Result time 10/27/19 11:19:22    Final result by Eh Salvador MD (10/27/19  11:19:22)                 Impression:      No acute cardiopulmonary disease.      Electronically signed by: Eh Salvador MD  Date:    10/27/2019  Time:    11:19             Narrative:    EXAMINATION:  XR CHEST AP PORTABLE    CLINICAL HISTORY:  Chest Pain;    TECHNIQUE:  Single frontal view of the chest was performed.    COMPARISON:  None    FINDINGS:  Mild coarsening of bronchovascular markings/COPD.  No pleural effusion or pneumothorax.    The cardiac silhouette is enlarged.  The hilar and mediastinal contours are unremarkable.    Mild thoracic scoliotic curvature convex right with multilevel degenerative changes.

## 2019-10-27 NOTE — ASSESSMENT & PLAN NOTE
Has been unable to take antihypertensive due to vomiting x 2 days  Likely contributing to chest pain  Pt stated what his blood pressure meds were but did not know any of his dosages  He said his wife would not bring to the hospital  Amlodipine, Coreg and hydralazine ordered  Prn hydralazine or lopressor for uncontrolled blood pressure

## 2019-10-28 PROBLEM — I25.118 CORONARY ARTERY DISEASE OF NATIVE ARTERY OF NATIVE HEART WITH STABLE ANGINA PECTORIS: Status: ACTIVE | Noted: 2019-10-28

## 2019-10-28 LAB
ALBUMIN SERPL BCP-MCNC: 3.6 G/DL (ref 3.5–5.2)
ALP SERPL-CCNC: 58 U/L (ref 55–135)
ALP SERPL-CCNC: 59 U/L (ref 55–135)
ALT SERPL W/O P-5'-P-CCNC: 6 U/L (ref 10–44)
ALT SERPL W/O P-5'-P-CCNC: 7 U/L (ref 10–44)
ANION GAP SERPL CALC-SCNC: 18 MMOL/L (ref 8–16)
ANION GAP SERPL CALC-SCNC: 19 MMOL/L (ref 8–16)
AST SERPL-CCNC: 10 U/L (ref 10–40)
AST SERPL-CCNC: 11 U/L (ref 10–40)
BASOPHILS # BLD AUTO: 0.03 K/UL (ref 0–0.2)
BASOPHILS NFR BLD: 0.5 % (ref 0–1.9)
BILIRUB DIRECT SERPL-MCNC: 0.4 MG/DL (ref 0.1–0.3)
BILIRUB SERPL-MCNC: 0.9 MG/DL (ref 0.1–1)
BILIRUB SERPL-MCNC: 1 MG/DL (ref 0.1–1)
BUN SERPL-MCNC: 63 MG/DL (ref 6–20)
BUN SERPL-MCNC: 64 MG/DL (ref 6–20)
CALCIUM SERPL-MCNC: 9.3 MG/DL (ref 8.7–10.5)
CALCIUM SERPL-MCNC: 9.5 MG/DL (ref 8.7–10.5)
CHLORIDE SERPL-SCNC: 96 MMOL/L (ref 95–110)
CHLORIDE SERPL-SCNC: 97 MMOL/L (ref 95–110)
CHOLEST SERPL-MCNC: 112 MG/DL (ref 120–199)
CHOLEST/HDLC SERPL: 4.3 {RATIO} (ref 2–5)
CO2 SERPL-SCNC: 23 MMOL/L (ref 23–29)
CO2 SERPL-SCNC: 24 MMOL/L (ref 23–29)
CREAT SERPL-MCNC: 11.4 MG/DL (ref 0.5–1.4)
CREAT SERPL-MCNC: 11.4 MG/DL (ref 0.5–1.4)
DIASTOLIC DYSFUNCTION: YES
DIFFERENTIAL METHOD: ABNORMAL
EOSINOPHIL # BLD AUTO: 0.4 K/UL (ref 0–0.5)
EOSINOPHIL NFR BLD: 6.2 % (ref 0–8)
ERYTHROCYTE [DISTWIDTH] IN BLOOD BY AUTOMATED COUNT: 13.9 % (ref 11.5–14.5)
EST. GFR  (AFRICAN AMERICAN): 6 ML/MIN/1.73 M^2
EST. GFR  (AFRICAN AMERICAN): 6 ML/MIN/1.73 M^2
EST. GFR  (NON AFRICAN AMERICAN): 5 ML/MIN/1.73 M^2
EST. GFR  (NON AFRICAN AMERICAN): 5 ML/MIN/1.73 M^2
ESTIMATED AVG GLUCOSE: 157 MG/DL (ref 68–131)
GLUCOSE SERPL-MCNC: 215 MG/DL (ref 70–110)
GLUCOSE SERPL-MCNC: 216 MG/DL (ref 70–110)
HBA1C MFR BLD HPLC: 7.1 % (ref 4–5.6)
HCT VFR BLD AUTO: 25.2 % (ref 40–54)
HCT VFR BLD AUTO: 26.3 % (ref 40–54)
HCT VFR BLD AUTO: 26.3 % (ref 40–54)
HDLC SERPL-MCNC: 26 MG/DL (ref 40–75)
HDLC SERPL: 23.2 % (ref 20–50)
HGB BLD-MCNC: 8.2 G/DL (ref 14–18)
HGB BLD-MCNC: 8.5 G/DL (ref 14–18)
HGB BLD-MCNC: 8.5 G/DL (ref 14–18)
IMM GRANULOCYTES # BLD AUTO: 0.03 K/UL (ref 0–0.04)
IMM GRANULOCYTES NFR BLD AUTO: 0.5 % (ref 0–0.5)
LDLC SERPL CALC-MCNC: 53.6 MG/DL (ref 63–159)
LYMPHOCYTES # BLD AUTO: 0.9 K/UL (ref 1–4.8)
LYMPHOCYTES NFR BLD: 14.3 % (ref 18–48)
MCH RBC QN AUTO: 28.9 PG (ref 27–31)
MCHC RBC AUTO-ENTMCNC: 32.3 G/DL (ref 32–36)
MCV RBC AUTO: 90 FL (ref 82–98)
MITRAL VALVE MOBILITY: NORMAL
MONOCYTES # BLD AUTO: 0.4 K/UL (ref 0.3–1)
MONOCYTES NFR BLD: 6.2 % (ref 4–15)
NEUTROPHILS # BLD AUTO: 4.5 K/UL (ref 1.8–7.7)
NEUTROPHILS NFR BLD: 72.3 % (ref 38–73)
NONHDLC SERPL-MCNC: 86 MG/DL
NRBC BLD-RTO: 0 /100 WBC
PHOSPHATE SERPL-MCNC: 7.2 MG/DL (ref 2.7–4.5)
PLATELET # BLD AUTO: 150 K/UL (ref 150–350)
PMV BLD AUTO: 10.4 FL (ref 9.2–12.9)
POCT GLUCOSE: 183 MG/DL (ref 70–110)
POCT GLUCOSE: 200 MG/DL (ref 70–110)
POTASSIUM SERPL-SCNC: 4.7 MMOL/L (ref 3.5–5.1)
POTASSIUM SERPL-SCNC: 4.7 MMOL/L (ref 3.5–5.1)
PROT SERPL-MCNC: 7.2 G/DL (ref 6–8.4)
PROT SERPL-MCNC: 7.3 G/DL (ref 6–8.4)
RBC # BLD AUTO: 2.94 M/UL (ref 4.6–6.2)
RETIRED EF AND QEF - SEE NOTES: 55 (ref 55–65)
SODIUM SERPL-SCNC: 138 MMOL/L (ref 136–145)
SODIUM SERPL-SCNC: 139 MMOL/L (ref 136–145)
TRIGL SERPL-MCNC: 162 MG/DL (ref 30–150)
TROPONIN I SERPL DL<=0.01 NG/ML-MCNC: 0.07 NG/ML (ref 0–0.03)
WBC # BLD AUTO: 6.17 K/UL (ref 3.9–12.7)

## 2019-10-28 PROCEDURE — 84484 ASSAY OF TROPONIN QUANT: CPT

## 2019-10-28 PROCEDURE — 93010 EKG 12-LEAD: ICD-10-PCS | Mod: ,,, | Performed by: INTERNAL MEDICINE

## 2019-10-28 PROCEDURE — 63600175 PHARM REV CODE 636 W HCPCS: Performed by: NURSE PRACTITIONER

## 2019-10-28 PROCEDURE — 82247 BILIRUBIN TOTAL: CPT

## 2019-10-28 PROCEDURE — 36415 COLL VENOUS BLD VENIPUNCTURE: CPT

## 2019-10-28 PROCEDURE — 85018 HEMOGLOBIN: CPT

## 2019-10-28 PROCEDURE — 96375 TX/PRO/DX INJ NEW DRUG ADDON: CPT

## 2019-10-28 PROCEDURE — 80069 RENAL FUNCTION PANEL: CPT

## 2019-10-28 PROCEDURE — 25000003 PHARM REV CODE 250: Performed by: NURSE PRACTITIONER

## 2019-10-28 PROCEDURE — 85025 COMPLETE CBC W/AUTO DIFF WBC: CPT

## 2019-10-28 PROCEDURE — 80053 COMPREHEN METABOLIC PANEL: CPT

## 2019-10-28 PROCEDURE — 93306 2D ECHO WITH COLOR FLOW DOPPLER: ICD-10-PCS | Mod: 26,,, | Performed by: INTERNAL MEDICINE

## 2019-10-28 PROCEDURE — 96365 THER/PROPH/DIAG IV INF INIT: CPT

## 2019-10-28 PROCEDURE — 80074 ACUTE HEPATITIS PANEL: CPT

## 2019-10-28 PROCEDURE — 25000003 PHARM REV CODE 250: Performed by: FAMILY MEDICINE

## 2019-10-28 PROCEDURE — 25000003 PHARM REV CODE 250: Performed by: EMERGENCY MEDICINE

## 2019-10-28 PROCEDURE — 93306 TTE W/DOPPLER COMPLETE: CPT

## 2019-10-28 PROCEDURE — 99214 PR OFFICE/OUTPT VISIT, EST, LEVL IV, 30-39 MIN: ICD-10-PCS | Mod: ,,, | Performed by: INTERNAL MEDICINE

## 2019-10-28 PROCEDURE — 93306 TTE W/DOPPLER COMPLETE: CPT | Mod: 26,,, | Performed by: INTERNAL MEDICINE

## 2019-10-28 PROCEDURE — 83036 HEMOGLOBIN GLYCOSYLATED A1C: CPT

## 2019-10-28 PROCEDURE — 80061 LIPID PANEL: CPT

## 2019-10-28 PROCEDURE — 99214 OFFICE O/P EST MOD 30 MIN: CPT | Mod: ,,, | Performed by: INTERNAL MEDICINE

## 2019-10-28 PROCEDURE — 99291 PR CRITICAL CARE, E/M 30-74 MINUTES: ICD-10-PCS | Mod: ,,, | Performed by: NURSE PRACTITIONER

## 2019-10-28 PROCEDURE — 96366 THER/PROPH/DIAG IV INF ADDON: CPT

## 2019-10-28 PROCEDURE — 93010 ELECTROCARDIOGRAM REPORT: CPT | Mod: ,,, | Performed by: INTERNAL MEDICINE

## 2019-10-28 PROCEDURE — 96376 TX/PRO/DX INJ SAME DRUG ADON: CPT

## 2019-10-28 PROCEDURE — 99223 PR INITIAL HOSPITAL CARE,LEVL III: ICD-10-PCS | Mod: 25,,, | Performed by: INTERNAL MEDICINE

## 2019-10-28 PROCEDURE — S0028 INJECTION, FAMOTIDINE, 20 MG: HCPCS | Performed by: NURSE PRACTITIONER

## 2019-10-28 PROCEDURE — 20000000 HC ICU ROOM

## 2019-10-28 PROCEDURE — 99223 1ST HOSP IP/OBS HIGH 75: CPT | Mod: 25,,, | Performed by: INTERNAL MEDICINE

## 2019-10-28 PROCEDURE — 93005 ELECTROCARDIOGRAM TRACING: CPT

## 2019-10-28 PROCEDURE — 84075 ASSAY ALKALINE PHOSPHATASE: CPT

## 2019-10-28 PROCEDURE — 85014 HEMATOCRIT: CPT

## 2019-10-28 PROCEDURE — 99291 CRITICAL CARE FIRST HOUR: CPT | Mod: ,,, | Performed by: NURSE PRACTITIONER

## 2019-10-28 RX ORDER — SODIUM CHLORIDE 9 MG/ML
INJECTION, SOLUTION INTRAVENOUS ONCE
Status: DISCONTINUED | OUTPATIENT
Start: 2019-10-28 | End: 2019-10-28

## 2019-10-28 RX ORDER — CLOPIDOGREL BISULFATE 75 MG/1
75 TABLET ORAL DAILY
COMMUNITY

## 2019-10-28 RX ORDER — CLONIDINE 0.2 MG/24H
1 PATCH, EXTENDED RELEASE TRANSDERMAL WEEKLY
Status: DISCONTINUED | OUTPATIENT
Start: 2019-10-28 | End: 2019-10-29

## 2019-10-28 RX ORDER — CLONIDINE 0.2 MG/24H
1 PATCH, EXTENDED RELEASE TRANSDERMAL WEEKLY
COMMUNITY

## 2019-10-28 RX ORDER — CLOPIDOGREL BISULFATE 75 MG/1
75 TABLET ORAL DAILY
Status: DISCONTINUED | OUTPATIENT
Start: 2019-10-28 | End: 2019-10-30 | Stop reason: HOSPADM

## 2019-10-28 RX ORDER — METOCLOPRAMIDE HYDROCHLORIDE 5 MG/5ML
10 SOLUTION ORAL
Status: DISCONTINUED | OUTPATIENT
Start: 2019-10-28 | End: 2019-10-28

## 2019-10-28 RX ORDER — NITROGLYCERIN 20 MG/100ML
5 INJECTION INTRAVENOUS CONTINUOUS
Status: DISCONTINUED | OUTPATIENT
Start: 2019-10-28 | End: 2019-10-28

## 2019-10-28 RX ORDER — SILVER SULFADIAZINE 10 G/1000G
CREAM TOPICAL DAILY
COMMUNITY

## 2019-10-28 RX ORDER — GABAPENTIN 300 MG/1
300 CAPSULE ORAL NIGHTLY
COMMUNITY

## 2019-10-28 RX ORDER — HYDRALAZINE HYDROCHLORIDE 50 MG/1
50 TABLET, FILM COATED ORAL
Status: DISCONTINUED | OUTPATIENT
Start: 2019-10-28 | End: 2019-10-28 | Stop reason: SDUPTHER

## 2019-10-28 RX ORDER — CARVEDILOL 12.5 MG/1
25 TABLET ORAL 2 TIMES DAILY WITH MEALS
Status: DISCONTINUED | OUTPATIENT
Start: 2019-10-28 | End: 2019-10-30 | Stop reason: HOSPADM

## 2019-10-28 RX ORDER — AMLODIPINE BESYLATE 5 MG/1
5 TABLET ORAL DAILY
COMMUNITY

## 2019-10-28 RX ORDER — AMLODIPINE BESYLATE 5 MG/1
5 TABLET ORAL DAILY
Status: DISCONTINUED | OUTPATIENT
Start: 2019-10-28 | End: 2019-10-28 | Stop reason: SDUPTHER

## 2019-10-28 RX ORDER — ATORVASTATIN CALCIUM 40 MG/1
80 TABLET, FILM COATED ORAL NIGHTLY
Status: DISCONTINUED | OUTPATIENT
Start: 2019-10-28 | End: 2019-10-30 | Stop reason: HOSPADM

## 2019-10-28 RX ORDER — ISOSORBIDE MONONITRATE 30 MG/1
30 TABLET, EXTENDED RELEASE ORAL DAILY
Status: DISCONTINUED | OUTPATIENT
Start: 2019-10-28 | End: 2019-10-30

## 2019-10-28 RX ORDER — HYDROCODONE BITARTRATE AND ACETAMINOPHEN 10; 325 MG/1; MG/1
1 TABLET ORAL EVERY 6 HOURS PRN
COMMUNITY

## 2019-10-28 RX ORDER — MUPIROCIN 20 MG/G
OINTMENT TOPICAL 2 TIMES DAILY
Status: DISCONTINUED | OUTPATIENT
Start: 2019-10-28 | End: 2019-10-28 | Stop reason: CLARIF

## 2019-10-28 RX ORDER — HYDRALAZINE HYDROCHLORIDE 20 MG/ML
20 INJECTION INTRAMUSCULAR; INTRAVENOUS ONCE
Status: COMPLETED | OUTPATIENT
Start: 2019-10-28 | End: 2019-10-28

## 2019-10-28 RX ORDER — CARVEDILOL 25 MG/1
25 TABLET ORAL 2 TIMES DAILY WITH MEALS
COMMUNITY

## 2019-10-28 RX ORDER — GABAPENTIN 300 MG/1
300 CAPSULE ORAL NIGHTLY
Status: DISCONTINUED | OUTPATIENT
Start: 2019-10-28 | End: 2019-10-30 | Stop reason: HOSPADM

## 2019-10-28 RX ORDER — ATORVASTATIN CALCIUM 80 MG/1
80 TABLET, FILM COATED ORAL NIGHTLY
COMMUNITY

## 2019-10-28 RX ORDER — METOCLOPRAMIDE HYDROCHLORIDE 5 MG/ML
10 INJECTION INTRAMUSCULAR; INTRAVENOUS
Status: DISCONTINUED | OUTPATIENT
Start: 2019-10-28 | End: 2019-10-28

## 2019-10-28 RX ORDER — FAMOTIDINE 20 MG/1
20 TABLET, FILM COATED ORAL DAILY
Status: DISCONTINUED | OUTPATIENT
Start: 2019-10-29 | End: 2019-10-30 | Stop reason: HOSPADM

## 2019-10-28 RX ORDER — ONDANSETRON 4 MG/1
4 TABLET, ORALLY DISINTEGRATING ORAL
Status: DISCONTINUED | OUTPATIENT
Start: 2019-10-28 | End: 2019-10-30 | Stop reason: HOSPADM

## 2019-10-28 RX ORDER — HYDRALAZINE HYDROCHLORIDE 20 MG/ML
20 INJECTION INTRAMUSCULAR; INTRAVENOUS ONCE
Status: DISCONTINUED | OUTPATIENT
Start: 2019-10-28 | End: 2019-10-28

## 2019-10-28 RX ORDER — HYDRALAZINE HYDROCHLORIDE 50 MG/1
50 TABLET, FILM COATED ORAL
COMMUNITY

## 2019-10-28 RX ORDER — CLOPIDOGREL BISULFATE 75 MG/1
75 TABLET ORAL DAILY
Status: DISCONTINUED | OUTPATIENT
Start: 2019-10-28 | End: 2019-10-28 | Stop reason: SDUPTHER

## 2019-10-28 RX ORDER — LOPERAMIDE HYDROCHLORIDE 2 MG/1
2 CAPSULE ORAL 2 TIMES DAILY PRN
Status: DISCONTINUED | OUTPATIENT
Start: 2019-10-28 | End: 2019-10-30 | Stop reason: HOSPADM

## 2019-10-28 RX ORDER — LISINOPRIL 20 MG/1
20 TABLET ORAL DAILY
COMMUNITY

## 2019-10-28 RX ORDER — LISINOPRIL 20 MG/1
20 TABLET ORAL DAILY
Status: DISCONTINUED | OUTPATIENT
Start: 2019-10-28 | End: 2019-10-28 | Stop reason: SDUPTHER

## 2019-10-28 RX ORDER — LOPERAMIDE HYDROCHLORIDE 2 MG/1
2 CAPSULE ORAL 2 TIMES DAILY
Status: DISCONTINUED | OUTPATIENT
Start: 2019-10-28 | End: 2019-10-28

## 2019-10-28 RX ORDER — LISINOPRIL 20 MG/1
40 TABLET ORAL DAILY
Status: DISCONTINUED | OUTPATIENT
Start: 2019-10-28 | End: 2019-10-30 | Stop reason: HOSPADM

## 2019-10-28 RX ORDER — NICARDIPINE HYDROCHLORIDE 0.2 MG/ML
5 INJECTION INTRAVENOUS CONTINUOUS
Status: DISCONTINUED | OUTPATIENT
Start: 2019-10-28 | End: 2019-10-29

## 2019-10-28 RX ORDER — SILVER SULFADIAZINE 10 G/1000G
CREAM TOPICAL DAILY
Status: DISCONTINUED | OUTPATIENT
Start: 2019-10-28 | End: 2019-10-30 | Stop reason: HOSPADM

## 2019-10-28 RX ORDER — HYDROCODONE BITARTRATE AND ACETAMINOPHEN 10; 325 MG/1; MG/1
1 TABLET ORAL EVERY 6 HOURS PRN
Status: DISCONTINUED | OUTPATIENT
Start: 2019-10-28 | End: 2019-10-30 | Stop reason: HOSPADM

## 2019-10-28 RX ORDER — SODIUM CHLORIDE 9 MG/ML
INJECTION, SOLUTION INTRAVENOUS
Status: DISCONTINUED | OUTPATIENT
Start: 2019-10-28 | End: 2019-10-30 | Stop reason: SDUPTHER

## 2019-10-28 RX ORDER — LOPERAMIDE HYDROCHLORIDE 2 MG/1
2 CAPSULE ORAL 2 TIMES DAILY
COMMUNITY

## 2019-10-28 RX ADMIN — NICARDIPINE HYDROCHLORIDE 5 MG/HR: 0.2 INJECTION, SOLUTION INTRAVENOUS at 06:10

## 2019-10-28 RX ADMIN — NITROGLYCERIN 0.4 MG: 0.4 TABLET, ORALLY DISINTEGRATING SUBLINGUAL at 01:10

## 2019-10-28 RX ADMIN — PROMETHAZINE HYDROCHLORIDE 6.25 MG: 25 INJECTION INTRAMUSCULAR; INTRAVENOUS at 03:10

## 2019-10-28 RX ADMIN — CLONIDINE 1 PATCH: 0.2 PATCH TRANSDERMAL at 05:10

## 2019-10-28 RX ADMIN — METOCLOPRAMIDE HYDROCHLORIDE 10 MG: 5 SOLUTION ORAL at 01:10

## 2019-10-28 RX ADMIN — CARVEDILOL 25 MG: 12.5 TABLET, FILM COATED ORAL at 05:10

## 2019-10-28 RX ADMIN — HYDRALAZINE HYDROCHLORIDE 75 MG: 50 TABLET, FILM COATED ORAL at 01:10

## 2019-10-28 RX ADMIN — FAMOTIDINE 20 MG: 20 INJECTION, SOLUTION INTRAVENOUS at 09:10

## 2019-10-28 RX ADMIN — NITROGLYCERIN 5 MCG/MIN: 20 INJECTION INTRAVENOUS at 03:10

## 2019-10-28 RX ADMIN — ONDANSETRON 4 MG: 2 INJECTION INTRAMUSCULAR; INTRAVENOUS at 06:10

## 2019-10-28 RX ADMIN — HYDRALAZINE HYDROCHLORIDE 75 MG: 50 TABLET, FILM COATED ORAL at 09:10

## 2019-10-28 RX ADMIN — CLOPIDOGREL BISULFATE 75 MG: 75 TABLET ORAL at 10:10

## 2019-10-28 RX ADMIN — AMLODIPINE BESYLATE 5 MG: 5 TABLET ORAL at 09:10

## 2019-10-28 RX ADMIN — HYDRALAZINE HYDROCHLORIDE 20 MG: 20 INJECTION INTRAMUSCULAR; INTRAVENOUS at 01:10

## 2019-10-28 RX ADMIN — ONDANSETRON 4 MG: 2 INJECTION INTRAMUSCULAR; INTRAVENOUS at 10:10

## 2019-10-28 RX ADMIN — GABAPENTIN 300 MG: 300 CAPSULE ORAL at 09:10

## 2019-10-28 RX ADMIN — HYDRALAZINE HYDROCHLORIDE 10 MG: 20 INJECTION INTRAMUSCULAR; INTRAVENOUS at 11:10

## 2019-10-28 RX ADMIN — TRAMADOL HYDROCHLORIDE 50 MG: 50 TABLET, FILM COATED ORAL at 04:10

## 2019-10-28 RX ADMIN — ONDANSETRON 4 MG: 4 TABLET, ORALLY DISINTEGRATING ORAL at 09:10

## 2019-10-28 RX ADMIN — SILVER SULFADIAZINE: 10 CREAM TOPICAL at 03:10

## 2019-10-28 RX ADMIN — ATORVASTATIN CALCIUM 80 MG: 40 TABLET, FILM COATED ORAL at 09:10

## 2019-10-28 RX ADMIN — HYDRALAZINE HYDROCHLORIDE 10 MG: 20 INJECTION INTRAMUSCULAR; INTRAVENOUS at 05:10

## 2019-10-28 RX ADMIN — HYDRALAZINE HYDROCHLORIDE 10 MG: 20 INJECTION INTRAMUSCULAR; INTRAVENOUS at 04:10

## 2019-10-28 RX ADMIN — LISINOPRIL 40 MG: 20 TABLET ORAL at 10:10

## 2019-10-28 RX ADMIN — HYDROCODONE BITARTRATE AND ACETAMINOPHEN 1 TABLET: 10; 325 TABLET ORAL at 05:10

## 2019-10-28 RX ADMIN — ISOSORBIDE MONONITRATE 30 MG: 30 TABLET, EXTENDED RELEASE ORAL at 10:10

## 2019-10-28 RX ADMIN — METOCLOPRAMIDE 10 MG: 5 INJECTION, SOLUTION INTRAMUSCULAR; INTRAVENOUS at 02:10

## 2019-10-28 RX ADMIN — CARVEDILOL 3.12 MG: 3.12 TABLET, FILM COATED ORAL at 09:10

## 2019-10-28 RX ADMIN — HYDRALAZINE HYDROCHLORIDE 25 MG: 25 TABLET, FILM COATED ORAL at 05:10

## 2019-10-28 RX ADMIN — ASPIRIN 81 MG: 81 TABLET, COATED ORAL at 09:10

## 2019-10-28 NOTE — HPI
"Brody Robins is a 44 year old male who presented to [facility] due to chest pain with associated shortness of breath and vomiting. His current medical conditions include CAD s/p coronary stenting, HTN, ESRD on HD, PRES, DM Type II, Right BKA. ED workup revealed /101, H/H 7.3/22.1, Plts 140, Cr 10, BNP 2734, Troponin 0.099, CXR revealed mild coarsening bronchovascular markings/COPD. He was placed in observation under the care of hospital medicine. Cardiology consulted to assist with medical management. Chart reviewed, patient seen and examined. Patient endorses PCI in June or July in [place] and was started on ASA and Plavix. He underwent LHC 2 weeks ago in [place], [place] at [facility] which patient reports as "normal". He has been having issues with elevated BP for the past several years. Denies chest pain on exam today. No shortness of breath, PHAN or palpitations. Denies any missed HD treatments. He reports that he has been adjusting his HTN meds per primary cardiologist in [place] but his BP remains extremely elevated today on exam. ECHO pending. Medication adjustments made today. Agree with PRBC transfusion today.   "

## 2019-10-28 NOTE — SUBJECTIVE & OBJECTIVE
Interval History:     10/28/19: patient reports intermittent nausea and abdominal pain.         Review of patient's allergies indicates:   Allergen Reactions    Morphine      Current Facility-Administered Medications   Medication Frequency    0.9%  NaCl infusion (for blood administration) Q24H PRN    acetaminophen tablet 650 mg Q6H PRN    amLODIPine tablet 5 mg Daily    aspirin EC tablet 81 mg Daily    atorvastatin tablet 40 mg QHS    carvedilol tablet 3.125 mg BID    clopidogrel tablet 75 mg Daily    dextrose 10% (D10W) Bolus PRN    famotidine IVPB 20 mg Daily    glucagon (human recombinant) injection 1 mg PRN    heparin (porcine) injection 5,000 Units Q8H    hydrALAZINE injection 10 mg Q6H PRN    hydrALAZINE tablet 75 mg Q8H    insulin aspart U-100 pen 0-5 Units Q6H PRN    isosorbide mononitrate 24 hr tablet 30 mg Daily    lisinopril tablet 40 mg Daily    metoclopramide HCl 5 mg/5 mL solution 10 mg TID AC    nitroGLYCERIN SL tablet 0.4 mg Q5 Min PRN    ondansetron disintegrating tablet 4 mg QID (AC & HS)    ondansetron injection 4 mg Q8H PRN    promethazine (PHENERGAN) 6.25 mg in dextrose 5 % 50 mL IVPB Q6H PRN    traMADol tablet 50 mg Q6H PRN       Objective:     Vital Signs (Most Recent):  Temp: 98.3 °F (36.8 °C) (10/28/19 0745)  Pulse: 89 (10/28/19 1016)  Resp: 16 (10/28/19 0745)  BP: (!) 208/98 (10/28/19 1016)  SpO2: (!) 93 % (10/28/19 1016)  O2 Device (Oxygen Therapy): room air (10/27/19 2202) Vital Signs (24h Range):  Temp:  [96.4 °F (35.8 °C)-99.3 °F (37.4 °C)] 98.3 °F (36.8 °C)  Pulse:  [83-97] 89  Resp:  [] 16  SpO2:  [93 %-99 %] 93 %  BP: (174-223)/() 208/98     Weight: 99.1 kg (218 lb 7.6 oz) (10/28/19 0532)  Body mass index is 32.26 kg/m².  Body surface area is 2.2 meters squared.    I/O last 3 completed shifts:  In: 865 [P.O.:550; Blood:315]  Out: -     Physical Exam   Constitutional: He is oriented to person, place, and time. He appears well-developed. He is  cooperative.   HENT:   Head: Normocephalic.   Nose: No rhinorrhea.   Mouth/Throat: Mucous membranes are normal. No oropharyngeal exudate.   Neck: No thyroid mass present.   Cardiovascular: Normal rate, regular rhythm, S1 normal, S2 normal and intact distal pulses.   Pulmonary/Chest: Effort normal. No respiratory distress. He has no wheezes.   Abdominal: Soft. Bowel sounds are normal. He exhibits no distension. There is tenderness. No hernia.   Musculoskeletal: He exhibits edema.   S/p right BKA.   Trace Left LE edema.    Lymphadenopathy:     He has no cervical adenopathy.   Neurological: He is alert and oriented to person, place, and time.   Skin: Skin is warm and dry. Rash: .lastre.   Psychiatric: He has a normal mood and affect.       Significant Labs:   Lab Results   Component Value Date    CREATININE 10.0 (H) 10/27/2019    BUN 51 (H) 10/27/2019     10/27/2019    K 4.4 10/27/2019    CL 97 10/27/2019    CO2 24 10/27/2019     Lab Results   Component Value Date    CALCIUM 9.3 10/27/2019    PHOS 3.3 10/11/2009     Lab Results   Component Value Date    ALBUMIN 3.6 10/27/2019     Lab Results   Component Value Date    WBC 5.47 10/27/2019    HGB 7.3 (L) 10/27/2019    HCT 22.1 (L) 10/27/2019    MCV 89 10/27/2019     (L) 10/27/2019       No results for input(s): MG in the last 168 hours.      Significant Imaging:  Imaging Results          X-Ray Chest AP Portable (Final result)  Result time 10/27/19 11:19:22    Final result by Eh Salvador MD (10/27/19 11:19:22)                 Impression:      No acute cardiopulmonary disease.      Electronically signed by: Eh Salvador MD  Date:    10/27/2019  Time:    11:19             Narrative:    EXAMINATION:  XR CHEST AP PORTABLE    CLINICAL HISTORY:  Chest Pain;    TECHNIQUE:  Single frontal view of the chest was performed.    COMPARISON:  None    FINDINGS:  Mild coarsening of bronchovascular markings/COPD.  No pleural effusion or pneumothorax.    The cardiac  silhouette is enlarged.  The hilar and mediastinal contours are unremarkable.    Mild thoracic scoliotic curvature convex right with multilevel degenerative changes.

## 2019-10-28 NOTE — ASSESSMENT & PLAN NOTE
Prn analgesia  Limit prn opiate analgesia due to contraindication - slow of GI motility  Prn antiemetics    10/28  Start Reglan 10 mg IV ACHS   Schedule Zofran SL   Monitor

## 2019-10-28 NOTE — PROGRESS NOTES
"[facility] Hospital Medicine  Progress Note    Patient Name: Brody Robins  MRN: [id number]  Patient Class: IP- Inpatient   Admission Date: 10/27/2019  Length of Stay: 0 days  Attending Physician: Ankit Mesa MD  Primary Care Provider: Primary Doctor No        Subjective:     Principal Problem:Chest pain        HPI:  Pt is a 43 yo male with PMHx of ESRD on HD (dialyzes M,W,F), Diabetes, gastroparesis and CAD. He states he had placement of a coronary stent in July, 2019 and a recent heart cath approx 4 months. The pt states yesterday he started vomiting multiple episodes of greenish emesis. He is able to take his medications but has vomited everything. Also, pt describes chest pain described as stabbing and rates "8" (1-10) that radiates into his left arm and left leg. He denies any SOB but has noted some dizziness and lightheadedness. He denies fever, chills and states he has "diabetic stomach."  He is visiting here and last had dialysis on Friday at [facility] in [place]. Vital signs on arrival Temp 98.4, pulse 97, resp 18, B/P 213/101 and SpO2 = 100 %. Labs show Hgb 7.3/Hct 22.1, platelets 140, anion gap 17, BUN 51, creatinine 10.0, glucose 240, BNP = 2.734 and troponin 0.099. CXR shows Mild coarsening of bronchovascular markings/COPD.  Also, enlarged cardiac silhouette. Pt is placed on Observation due to anemia and further evaluation of chest pain and likely gastroparesis.         Overview/Hospital Course:  Mr Robins is a 44 year old who presented to [facility] on yesterday with complaints of multiple episodes of vomiting. Associated symptoms include chest pain radiating to left arm and leg. He reports having Left Heart Cath with stent placement in July 2019. Cardiology consulted, he was started back on ASA, Plavix and blood pressure medications. Blood pressure continued to be elevated after continued treatment. He also was experiencing chest pain radiating to left arm. Case reviewed with Cardiology and " patient was transferred to ICU and started on Tridil drip.       History reviewed. No pertinent past medical history.    History reviewed. No pertinent surgical history.    Review of patient's allergies indicates:   Allergen Reactions    Morphine        No current facility-administered medications on file prior to encounter.      Current Outpatient Medications on File Prior to Encounter   Medication Sig    amLODIPine (NORVASC) 5 MG tablet Take 5 mg by mouth once daily.    atorvastatin (LIPITOR) 80 MG tablet Take 80 mg by mouth every evening.    carvedilol (COREG) 25 MG tablet Take 25 mg by mouth 2 (two) times daily with meals.    cloNIDine 0.2 mg/24 hr td ptwk (CATAPRES) 0.2 mg/24 hr Place 1 patch onto the skin once a week.    clopidogrel (PLAVIX) 75 mg tablet Take 75 mg by mouth once daily.    gabapentin (NEURONTIN) 300 MG capsule Take 300 mg by mouth every evening.    hydrALAZINE (APRESOLINE) 50 MG tablet Take 50 mg by mouth 3 (three) times daily with meals.    HYDROcodone-acetaminophen (NORCO)  mg per tablet Take 1 tablet by mouth every 6 (six) hours as needed for Pain.    lisinopril (PRINIVIL,ZESTRIL) 20 MG tablet Take 20 mg by mouth once daily.    loperamide (IMODIUM) 2 mg capsule Take 2 mg by mouth 2 (two) times daily.    silver sulfADIAZINE 1% (SILVADENE) 1 % cream Apply topically once daily. Apply to wound on right lower stump daily.     Family History     None        Tobacco Use    Smoking status: Never Smoker    Smokeless tobacco: Never Used   Substance and Sexual Activity    Alcohol use: Never     Frequency: Never    Drug use: Never    Sexual activity: Not Currently     Review of Systems   Constitutional: Positive for activity change, appetite change (decreased) and fatigue. Negative for chills, diaphoresis and fever.   HENT: Negative for congestion, ear discharge, rhinorrhea, sinus pressure, sore throat and trouble swallowing.    Eyes: Negative for discharge and visual disturbance.    Respiratory: Positive for shortness of breath. Negative for apnea, cough, choking, chest tightness, wheezing and stridor.    Cardiovascular: Positive for chest pain. Negative for palpitations and leg swelling.   Gastrointestinal: Positive for nausea. Negative for abdominal distention, abdominal pain, diarrhea and vomiting.   Endocrine: Negative for cold intolerance and heat intolerance.   Genitourinary: Negative for dysuria, frequency and hematuria.   Musculoskeletal: Negative for arthralgias, back pain, myalgias and neck pain.        Left arm pain    Skin: Negative for pallor and rash.   Neurological: Positive for weakness. Negative for dizziness, seizures, syncope and headaches.   Psychiatric/Behavioral: Negative for agitation, confusion and sleep disturbance.     Objective:     Vital Signs (Most Recent):  Temp: 97.7 °F (36.5 °C) (10/28/19 1244)  Pulse: 99 (10/28/19 1420)  Resp: 18 (10/28/19 1244)  BP: (!) 183/84 (10/28/19 1420)  SpO2: (!) 94 % (10/28/19 1244) Vital Signs (24h Range):  Temp:  [96.4 °F (35.8 °C)-99.3 °F (37.4 °C)] 97.7 °F (36.5 °C)  Pulse:  [] 99  Resp:  [16-18] 18  SpO2:  [93 %-99 %] 94 %  BP: (174-223)/() 183/84     Weight: 99.1 kg (218 lb 7.6 oz)  Body mass index is 32.26 kg/m².    Physical Exam   Constitutional: He is oriented to person, place, and time. No distress.   Eyes: Right eye exhibits no discharge. Left eye exhibits no discharge.   Neck: No JVD present.   Cardiovascular: Exam reveals no gallop and no friction rub.   No murmur heard.  Pulmonary/Chest: No stridor. No respiratory distress. He has no wheezes. He has no rales. He exhibits no tenderness.   Abdominal: He exhibits distension. He exhibits no mass. There is no tenderness. There is no rebound and no guarding. No hernia.   Musculoskeletal: He exhibits no edema or deformity.   Neurological: He is alert and oriented to person, place, and time.   Skin: Skin is warm and dry. Capillary refill takes less than 2 seconds. He  is not diaphoretic.   Nursing note and vitals reviewed.          Significant Labs:   CBC:   Recent Labs   Lab 10/27/19  1102 10/28/19  1000   WBC 5.47 6.17   HGB 7.3* 8.5*  8.5*   HCT 22.1* 26.3*  26.3*   * 150     CMP:   Recent Labs   Lab 10/27/19  1102 10/28/19  1000 10/28/19  1045    138 139   K 4.4 4.7 4.7   CL 97 96 97   CO2 24 24 23   * 216* 215*   BUN 51* 64* 63*   CREATININE 10.0* 11.4* 11.4*   CALCIUM 9.3 9.3 9.5   PROT 7.1 7.3 7.2   ALBUMIN 3.6 3.6 3.6  3.6   BILITOT 0.8 1.0 0.9   ALKPHOS 58 58 59   AST 10 11 10   ALT 6* 6* 7*   ANIONGAP 17* 18* 19*   EGFRNONAA 6* 5* 5*     Cardiac Markers:   Recent Labs   Lab 10/27/19  1102   BNP 2,734*       Significant Imaging:     Imaging Results          X-Ray Chest AP Portable (Final result)  Result time 10/27/19 11:19:22    Final result by Eh Salvador MD (10/27/19 11:19:22)                 Impression:      No acute cardiopulmonary disease.      Electronically signed by: Eh Salvador MD  Date:    10/27/2019  Time:    11:19             Narrative:    EXAMINATION:  XR CHEST AP PORTABLE    CLINICAL HISTORY:  Chest Pain;    TECHNIQUE:  Single frontal view of the chest was performed.    COMPARISON:  None    FINDINGS:  Mild coarsening of bronchovascular markings/COPD.  No pleural effusion or pneumothorax.    The cardiac silhouette is enlarged.  The hilar and mediastinal contours are unremarkable.    Mild thoracic scoliotic curvature convex right with multilevel degenerative changes.                                Assessment/Plan:      * Chest pain    Admit to Inpatient  Trasnfer to ICU   Tridil switch to Cardene drip for better BP   Serial troponins 0.099, 0.074  Continue Coreg, ACEI and hydralazine   Start Imdur   Nitrates prn, Oxygen therapy to maintain sats > 92 %   2 D ECHO showed normal LVEF and diastolic dysfunction   Could be having chest pain due to hypertensive urgency      Coronary artery disease of native artery of native heart with  stable angina pectoris  Cardiology following  Continue ASA, Statin, Plavix., Imdur  and B blocker        Hypertensive urgency  Has been unable to take antihypertensive due to vomiting x 2 days  Likely contributing to chest pain  Pt stated what his blood pressure meds were but did not know any of his dosages  He said his wife would not bring to the hospital  Amlodipine, Coreg and hydralazine ordered  Prn hydralazine or lopressor for uncontrolled blood pressure    10/28  Transferred to ICU   Titrate Cardene, wean as tolerated   Continue PO BP medications       Gastroparesis  Prn analgesia  Limit prn opiate analgesia due to contraindication - slow of GI motility  Prn antiemetics    10/28  Start Reglan 10 mg IV ACHS   Schedule Zofran SL   Monitor     Anemia of chronic disease  Likely anemia of chronic disease related to ESRD  Type /cross match and transfuse 1 unit PrBCs  Monitor for hematochezia, hematemesis and melena  Serial H & H every 8 hours    10/28  H & H currently stable   Monitor         Diabetes mellitus, type II  Pt has been vomiting x 2 days  Keep NPO for now  Prn Zofran  accuchecks every 6 hours  Sliding scale insulin      ESRD (end stage renal disease)  Nephrology consult - to resume HD  Has access to left upper arm - + thrill, + bruit  Last dialyzed on Friday, 10/25 at [facility]        VTE Risk Mitigation (From admission, onward)         Ordered     heparin (porcine) injection 5,000 Units  Every 8 hours      10/27/19 1326                Critical care time spent on the evaluation and treatment of severe organ dysfunction, review of pertinent labs and imaging studies, discussions with consulting providers and discussions with patient/family: 40 minutes.      Andrea Hernandez NP  Department of Hospital Medicine   [facility]

## 2019-10-28 NOTE — PLAN OF CARE
Plan of care to be reviewed and updated with patient. Nausea and pain to be treated with PRN medications. Patient has been noncompliant with previous plans of care, refusing lab draws and certain medications; will offer education and encourage collaboration with healthcare team. Patient is NPO at this time. Cardiology to consult this morning. Safety precautions in place, including call light in reach. Will continue to monitor and treat.

## 2019-10-28 NOTE — ASSESSMENT & PLAN NOTE
Has been unable to take antihypertensive due to vomiting x 2 days  Likely contributing to chest pain  Pt stated what his blood pressure meds were but did not know any of his dosages  He said his wife would not bring to the hospital  Amlodipine, Coreg and hydralazine ordered  Prn hydralazine or lopressor for uncontrolled blood pressure    10/28  Transferred to ICU   Titrate Cardene, wean as tolerated   Continue PO BP medications

## 2019-10-28 NOTE — NURSING
Phlebotomist unable to collect blood sample despite two attempts. Previous phlebotomist also unable to collect blood. Informed charge nurse, Paget, RN, who states she will attempt blood draw.

## 2019-10-28 NOTE — SUBJECTIVE & OBJECTIVE
History reviewed. No pertinent past medical history.    History reviewed. No pertinent surgical history.    Review of patient's allergies indicates:   Allergen Reactions    Morphine        No current facility-administered medications on file prior to encounter.      Current Outpatient Medications on File Prior to Encounter   Medication Sig    amLODIPine (NORVASC) 5 MG tablet Take 5 mg by mouth once daily.    atorvastatin (LIPITOR) 80 MG tablet Take 80 mg by mouth every evening.    carvedilol (COREG) 25 MG tablet Take 25 mg by mouth 2 (two) times daily with meals.    cloNIDine 0.2 mg/24 hr td ptwk (CATAPRES) 0.2 mg/24 hr Place 1 patch onto the skin once a week.    clopidogrel (PLAVIX) 75 mg tablet Take 75 mg by mouth once daily.    gabapentin (NEURONTIN) 300 MG capsule Take 300 mg by mouth every evening.    hydrALAZINE (APRESOLINE) 50 MG tablet Take 50 mg by mouth 3 (three) times daily with meals.    HYDROcodone-acetaminophen (NORCO)  mg per tablet Take 1 tablet by mouth every 6 (six) hours as needed for Pain.    lisinopril (PRINIVIL,ZESTRIL) 20 MG tablet Take 20 mg by mouth once daily.    loperamide (IMODIUM) 2 mg capsule Take 2 mg by mouth 2 (two) times daily.    silver sulfADIAZINE 1% (SILVADENE) 1 % cream Apply topically once daily. Apply to wound on right lower stump daily.     Family History     None        Tobacco Use    Smoking status: Never Smoker    Smokeless tobacco: Never Used   Substance and Sexual Activity    Alcohol use: Never     Frequency: Never    Drug use: Never    Sexual activity: Not Currently     Review of Systems   Constitutional: Positive for activity change, appetite change (decreased) and fatigue. Negative for chills, diaphoresis and fever.   HENT: Negative for congestion, ear discharge, rhinorrhea, sinus pressure, sore throat and trouble swallowing.    Eyes: Negative for discharge and visual disturbance.   Respiratory: Positive for shortness of breath. Negative for  apnea, cough, choking, chest tightness, wheezing and stridor.    Cardiovascular: Positive for chest pain. Negative for palpitations and leg swelling.   Gastrointestinal: Positive for nausea. Negative for abdominal distention, abdominal pain, diarrhea and vomiting.   Endocrine: Negative for cold intolerance and heat intolerance.   Genitourinary: Negative for dysuria, frequency and hematuria.   Musculoskeletal: Negative for arthralgias, back pain, myalgias and neck pain.        Left arm pain    Skin: Negative for pallor and rash.   Neurological: Positive for weakness. Negative for dizziness, seizures, syncope and headaches.   Psychiatric/Behavioral: Negative for agitation, confusion and sleep disturbance.     Objective:     Vital Signs (Most Recent):  Temp: 97.7 °F (36.5 °C) (10/28/19 1244)  Pulse: 99 (10/28/19 1420)  Resp: 18 (10/28/19 1244)  BP: (!) 183/84 (10/28/19 1420)  SpO2: (!) 94 % (10/28/19 1244) Vital Signs (24h Range):  Temp:  [96.4 °F (35.8 °C)-99.3 °F (37.4 °C)] 97.7 °F (36.5 °C)  Pulse:  [] 99  Resp:  [16-18] 18  SpO2:  [93 %-99 %] 94 %  BP: (174-223)/() 183/84     Weight: 99.1 kg (218 lb 7.6 oz)  Body mass index is 32.26 kg/m².    Physical Exam   Constitutional: He is oriented to person, place, and time. No distress.   Eyes: Right eye exhibits no discharge. Left eye exhibits no discharge.   Neck: No JVD present.   Cardiovascular: Exam reveals no gallop and no friction rub.   No murmur heard.  Pulmonary/Chest: No stridor. No respiratory distress. He has no wheezes. He has no rales. He exhibits no tenderness.   Abdominal: He exhibits distension. He exhibits no mass. There is no tenderness. There is no rebound and no guarding. No hernia.   Musculoskeletal: He exhibits no edema or deformity.   Neurological: He is alert and oriented to person, place, and time.   Skin: Skin is warm and dry. Capillary refill takes less than 2 seconds. He is not diaphoretic.   Nursing note and vitals reviewed.           Significant Labs:   CBC:   Recent Labs   Lab 10/27/19  1102 10/28/19  1000   WBC 5.47 6.17   HGB 7.3* 8.5*  8.5*   HCT 22.1* 26.3*  26.3*   * 150     CMP:   Recent Labs   Lab 10/27/19  1102 10/28/19  1000 10/28/19  1045    138 139   K 4.4 4.7 4.7   CL 97 96 97   CO2 24 24 23   * 216* 215*   BUN 51* 64* 63*   CREATININE 10.0* 11.4* 11.4*   CALCIUM 9.3 9.3 9.5   PROT 7.1 7.3 7.2   ALBUMIN 3.6 3.6 3.6  3.6   BILITOT 0.8 1.0 0.9   ALKPHOS 58 58 59   AST 10 11 10   ALT 6* 6* 7*   ANIONGAP 17* 18* 19*   EGFRNONAA 6* 5* 5*     Cardiac Markers:   Recent Labs   Lab 10/27/19  1102   BNP 2,734*       Significant Imaging:     Imaging Results          X-Ray Chest AP Portable (Final result)  Result time 10/27/19 11:19:22    Final result by Eh Salvador MD (10/27/19 11:19:22)                 Impression:      No acute cardiopulmonary disease.      Electronically signed by: Eh Salvador MD  Date:    10/27/2019  Time:    11:19             Narrative:    EXAMINATION:  XR CHEST AP PORTABLE    CLINICAL HISTORY:  Chest Pain;    TECHNIQUE:  Single frontal view of the chest was performed.    COMPARISON:  None    FINDINGS:  Mild coarsening of bronchovascular markings/COPD.  No pleural effusion or pneumothorax.    The cardiac silhouette is enlarged.  The hilar and mediastinal contours are unremarkable.    Mild thoracic scoliotic curvature convex right with multilevel degenerative changes.

## 2019-10-28 NOTE — HOSPITAL COURSE
Mr Robins is a 44 year old who presented to [facility] on yesterday with complaints of multiple episodes of vomiting. Associated symptoms include chest pain radiating to left arm and leg. He reports having Left Heart Cath with stent placement in July 2019. Cardiology consulted, he was started back on ASA, Plavix and blood pressure medications. Blood pressure continued to be elevated after continued treatment. He also was experiencing chest pain radiating to left arm. Case reviewed with Cardiology and patient was transferred to ICU and started on Tridil drip.     Patient was admitted into ICU for cardene drip. He was transitioned to PO BP meds. On day of possible discharge his bp remained elevated ranging from 178-190s systolic. This was discussed with patient that we need to better optimize his meds for better BP control. Patient adamant he wanted to leave.

## 2019-10-28 NOTE — SUBJECTIVE & OBJECTIVE
History reviewed. No pertinent past medical history.    History reviewed. No pertinent surgical history.    Review of patient's allergies indicates:   Allergen Reactions    Morphine        No current facility-administered medications on file prior to encounter.      No current outpatient medications on file prior to encounter.     Family History     None        Tobacco Use    Smoking status: Never Smoker    Smokeless tobacco: Never Used   Substance and Sexual Activity    Alcohol use: Never     Frequency: Never    Drug use: Never    Sexual activity: Not Currently     Review of Systems   Constitution: Positive for malaise/fatigue.   HENT: Negative for hearing loss and hoarse voice.    Eyes: Negative for blurred vision and visual disturbance.   Cardiovascular: Positive for chest pain (resolved) and dyspnea on exertion. Negative for claudication, irregular heartbeat, leg swelling, near-syncope, orthopnea, palpitations, paroxysmal nocturnal dyspnea and syncope.        S/P PCI in June/July   Respiratory: Positive for shortness of breath. Negative for cough, hemoptysis, sleep disturbances due to breathing, snoring and wheezing.    Endocrine: Negative for cold intolerance and heat intolerance.   Hematologic/Lymphatic: Bruises/bleeds easily.   Skin: Negative for color change, dry skin and nail changes.   Musculoskeletal: Positive for arthritis and back pain. Negative for joint pain and myalgias.        S/p Right BKA   Gastrointestinal: Negative for bloating, abdominal pain, constipation, nausea and vomiting.   Genitourinary: Negative for dysuria, flank pain, hematuria and hesitancy.        +ESRD on HD   Neurological: Negative for headaches, light-headedness, loss of balance, numbness, paresthesias and weakness.   Psychiatric/Behavioral: Negative for altered mental status.   Allergic/Immunologic: Negative for environmental allergies.     Objective:     Vital Signs (Most Recent):  Temp: 98.3 °F (36.8 °C) (10/28/19  0745)  Pulse: 95 (10/28/19 0831)  Resp: 16 (10/28/19 0745)  BP: (!) 223/105 (10/28/19 0831)  SpO2: 99 % (10/28/19 0745) Vital Signs (24h Range):  Temp:  [96.4 °F (35.8 °C)-99.3 °F (37.4 °C)] 98.3 °F (36.8 °C)  Pulse:  [83-97] 95  Resp:  [] 16  SpO2:  [93 %-100 %] 99 %  BP: (174-223)/() 223/105     Weight: 99.1 kg (218 lb 7.6 oz)  Body mass index is 32.26 kg/m².    SpO2: 99 %  O2 Device (Oxygen Therapy): room air      Intake/Output Summary (Last 24 hours) at 10/28/2019 0955  Last data filed at 10/28/2019 0532  Gross per 24 hour   Intake 865 ml   Output --   Net 865 ml       Lines/Drains/Airways     Peripheral Intravenous Line                 Peripheral IV - Single Lumen 10/27/19 1102 22 G Right Wrist less than 1 day         Peripheral IV - Single Lumen 10/27/19 1212 20 G Right Forearm less than 1 day                Physical Exam   Constitutional: He is oriented to person, place, and time. He appears well-developed and well-nourished. No distress.   HENT:   Head: Normocephalic and atraumatic.   Eyes: Pupils are equal, round, and reactive to light.   Neck: Normal range of motion and full passive range of motion without pain. Neck supple. No JVD present.   Cardiovascular: Normal rate, regular rhythm, S1 normal, S2 normal and intact distal pulses. PMI is not displaced. Exam reveals no distant heart sounds.   No murmur heard.  Pulses:       Radial pulses are 2+ on the right side, and 2+ on the left side.        Dorsalis pedis pulses are 2+ on the left side.   Chest pain free on exam  BP extremely uncontrolled today   Pulmonary/Chest: Effort normal and breath sounds normal. No accessory muscle usage. No respiratory distress. He has no decreased breath sounds. He has no wheezes. He has no rales.   Abdominal: Soft. Bowel sounds are normal. He exhibits no distension. There is no tenderness.   Musculoskeletal: Normal range of motion. He exhibits no edema.        Left ankle: He exhibits no swelling.   Neurological:  He is alert and oriented to person, place, and time.   Skin: Skin is warm and dry. He is not diaphoretic. No cyanosis. Nails show no clubbing.   Psychiatric: He has a normal mood and affect. His speech is normal and behavior is normal. Judgment and thought content normal. Cognition and memory are normal.   Nursing note and vitals reviewed.      Significant Labs:   BMP:   Recent Labs   Lab 10/27/19  1102   *      K 4.4   CL 97   CO2 24   BUN 51*   CREATININE 10.0*   CALCIUM 9.3   , CBC   Recent Labs   Lab 10/27/19  1102   WBC 5.47   HGB 7.3*   HCT 22.1*   *   , Troponin   Recent Labs   Lab 10/27/19  1102   TROPONINI 0.099*    and All pertinent lab results from the last 24 hours have been reviewed.    Significant Imaging: Echocardiogram: 2D echo with color flow doppler: No results found for this or any previous visit. and X-Ray: CXR: X-Ray Chest 1 View (CXR): No results found for this visit on 10/27/19.

## 2019-10-28 NOTE — PROGRESS NOTES
Pt arrived to ICU bed 18 and secured to monitor. Pt alert and follows commands. On arrival pt stated he had a 8/10 pain scale and c/o cont nausea unrelieved with the zofran. Phenergan given and Tridil Gtt initiated. Chart reviewed and new orders noted. Safety maintained

## 2019-10-28 NOTE — PROGRESS NOTES
Care assumed on arrival to ICU. Pt alert and follows commands,. C/o chest pain unchanged with coughing or deep breaths. Pain is dull and across chest. Has left arm discomfort. Pt rates pain a 8/10 scale. tridil started. Skin assessment done. With multiple scabs to arms. R BKA with stump small scab/wound noted. Pt stated it has been there awhile . Call light in hand and orders noted.

## 2019-10-28 NOTE — PROGRESS NOTES
Clinical Pharmacy Progress Note: Telemetry Pharmacist Rounding     Patient was counseled by pharmacist on the telemetry pharmacist availability to discuss new medications, side effects, and reasons for changes in medication during admission.   Asked if patient had any medication questions at this time.   Instructed patient to use call light with any questions or concerns to discuss with pharmacy during the admission.   Offered bedside medication delivery to patient.   Patient expressed understanding and had no further questions.    Thank you for allowing us to participate in this patient's care.    Hortencia Gross, PharmD 10/28/2019 1:57 PM

## 2019-10-28 NOTE — PROGRESS NOTES
[facility] -   Nephrology  Progress Note    Patient Name: Brody Robins  MRN: [id number]  Admission Date: 10/27/2019  Hospital Length of Stay: 0 days  Attending Provider: Ankit Mesa MD   Primary Care Physician: Primary Doctor No  Principal Problem:Chest pain    Subjective:     HPI: 44 year old male with ESRD, anemia, hyperparathyroidism, DM2, gastroparesis, CAD, HTN presents to [facility] with vomiting and chest pain.     Nephrology was consulted to help with patient's renal care while he is admitted at [facility]. I saw and examined patient in his hospital room. Patient reports that he experienced nausea/vomiting and chest pain on day of admission. He also reports chronic abdominal pain due to gastroparesis. He reports right BKA due to diabetic complications.     Patient dialyzes on schedule TTS at [place], [place] under care of Dr. Adriana Saavedra. Access: left arm AVF.  He was last dialyzed at [facility] on Friday (patient is visiting from [place], [place]).       Interval History:     10/28/19: patient reports intermittent nausea and abdominal pain.         Review of patient's allergies indicates:   Allergen Reactions    Morphine      Current Facility-Administered Medications   Medication Frequency    0.9%  NaCl infusion (for blood administration) Q24H PRN    acetaminophen tablet 650 mg Q6H PRN    amLODIPine tablet 5 mg Daily    aspirin EC tablet 81 mg Daily    atorvastatin tablet 40 mg QHS    carvedilol tablet 3.125 mg BID    clopidogrel tablet 75 mg Daily    dextrose 10% (D10W) Bolus PRN    famotidine IVPB 20 mg Daily    glucagon (human recombinant) injection 1 mg PRN    heparin (porcine) injection 5,000 Units Q8H    hydrALAZINE injection 10 mg Q6H PRN    hydrALAZINE tablet 75 mg Q8H    insulin aspart U-100 pen 0-5 Units Q6H PRN    isosorbide mononitrate 24 hr tablet 30 mg Daily    lisinopril tablet 40 mg Daily    metoclopramide HCl 5 mg/5 mL solution 10 mg TID AC    nitroGLYCERIN  SL tablet 0.4 mg Q5 Min PRN    ondansetron disintegrating tablet 4 mg QID (AC & HS)    ondansetron injection 4 mg Q8H PRN    promethazine (PHENERGAN) 6.25 mg in dextrose 5 % 50 mL IVPB Q6H PRN    traMADol tablet 50 mg Q6H PRN       Objective:     Vital Signs (Most Recent):  Temp: 98.3 °F (36.8 °C) (10/28/19 0745)  Pulse: 89 (10/28/19 1016)  Resp: 16 (10/28/19 0745)  BP: (!) 208/98 (10/28/19 1016)  SpO2: (!) 93 % (10/28/19 1016)  O2 Device (Oxygen Therapy): room air (10/27/19 2202) Vital Signs (24h Range):  Temp:  [96.4 °F (35.8 °C)-99.3 °F (37.4 °C)] 98.3 °F (36.8 °C)  Pulse:  [83-97] 89  Resp:  [] 16  SpO2:  [93 %-99 %] 93 %  BP: (174-223)/() 208/98     Weight: 99.1 kg (218 lb 7.6 oz) (10/28/19 0532)  Body mass index is 32.26 kg/m².  Body surface area is 2.2 meters squared.    I/O last 3 completed shifts:  In: 865 [P.O.:550; Blood:315]  Out: -     Physical Exam   Constitutional: He is oriented to person, place, and time. He appears well-developed. He is cooperative.   HENT:   Head: Normocephalic.   Nose: No rhinorrhea.   Mouth/Throat: Mucous membranes are normal. No oropharyngeal exudate.   Neck: No thyroid mass present.   Cardiovascular: Normal rate, regular rhythm, S1 normal, S2 normal and intact distal pulses.   Pulmonary/Chest: Effort normal. No respiratory distress. He has no wheezes.   Abdominal: Soft. Bowel sounds are normal. He exhibits no distension. There is tenderness. No hernia.   Musculoskeletal: He exhibits edema.   S/p right BKA.   Trace Left LE edema.    Lymphadenopathy:     He has no cervical adenopathy.   Neurological: He is alert and oriented to person, place, and time.   Skin: Skin is warm and dry. Rash: .lastre.   Psychiatric: He has a normal mood and affect.       Significant Labs:   Lab Results   Component Value Date    CREATININE 10.0 (H) 10/27/2019    BUN 51 (H) 10/27/2019     10/27/2019    K 4.4 10/27/2019    CL 97 10/27/2019    CO2 24 10/27/2019     Lab Results    Component Value Date    CALCIUM 9.3 10/27/2019    PHOS 3.3 10/11/2009     Lab Results   Component Value Date    ALBUMIN 3.6 10/27/2019     Lab Results   Component Value Date    WBC 5.47 10/27/2019    HGB 7.3 (L) 10/27/2019    HCT 22.1 (L) 10/27/2019    MCV 89 10/27/2019     (L) 10/27/2019       No results for input(s): MG in the last 168 hours.      Significant Imaging:  Imaging Results          X-Ray Chest AP Portable (Final result)  Result time 10/27/19 11:19:22    Final result by Eh Salvador MD (10/27/19 11:19:22)                 Impression:      No acute cardiopulmonary disease.      Electronically signed by: Eh Salvador MD  Date:    10/27/2019  Time:    11:19             Narrative:    EXAMINATION:  XR CHEST AP PORTABLE    CLINICAL HISTORY:  Chest Pain;    TECHNIQUE:  Single frontal view of the chest was performed.    COMPARISON:  None    FINDINGS:  Mild coarsening of bronchovascular markings/COPD.  No pleural effusion or pneumothorax.    The cardiac silhouette is enlarged.  The hilar and mediastinal contours are unremarkable.    Mild thoracic scoliotic curvature convex right with multilevel degenerative changes.                                  Assessment/Plan:     * Chest pain  As per hospitalist/Cardiology.     Anemia of chronic disease  Patient will received 1 unit of PRBC on 10/27. Will monitor with repeat CBC.     ESRD (end stage renal disease)  Patient on dialyzes on TTS schedule at [facility].     Next HD tomorrow.     Access: left arm AVF.         Thank you for your consult. I will follow-up with patient. Please contact us if you have any additional questions.    Phong Titus MD  Nephrology  [facility] -

## 2019-10-28 NOTE — ASSESSMENT & PLAN NOTE
-BP continues to be extremely uncontrolled  -Continue Norvasc, BB, Hydralazine  -Resume home Lisinopril today  -Needs aggressive BP management  -Troponin pending  -Monitor BP trend and adjust as needed.

## 2019-10-28 NOTE — ASSESSMENT & PLAN NOTE
Patient on dialyzes on TTS schedule at [facility].     Next HD tomorrow.     Access: left arm AVF.

## 2019-10-28 NOTE — ASSESSMENT & PLAN NOTE
S/P PCI in June/July in [place]  Resume Plavix due to recent stenting  Continue ASA, Statin, Plavix, BB, ACEi, Norvasc, Hydralazine  Start Imdur daily  ECHO pending  Reassess in AM

## 2019-10-28 NOTE — PLAN OF CARE
Patient transferred to unit. Plan of care updated and reviewed with patient. Nausea treated with IV zofran. Pain treated with tramadol. Unit of blood prepared to be administered. Patient remains free of falls this shift. Safety precautions in place, including call light in reach. Will continue to monitor and treat.

## 2019-10-28 NOTE — ASSESSMENT & PLAN NOTE
Likely anemia of chronic disease related to ESRD  Type /cross match and transfuse 1 unit PrBCs  Monitor for hematochezia, hematemesis and melena  Serial H & H every 8 hours    10/28  H & H currently stable   Monitor

## 2019-10-28 NOTE — PLAN OF CARE
10/28/19 1246   Discharge Assessment   Assessment Type Discharge Planning Assessment   Confirmed/corrected address and phone number on facesheet? Yes   Assessment information obtained from? Patient   Prior to hospitilization cognitive status: Alert/Oriented   Prior to hospitalization functional status: Independent;Assistive Equipment   Current cognitive status: Alert/Oriented   Current Functional Status: Needs Assistance;Assistive Equipment   Lives With spouse   Able to Return to Prior Arrangements yes   Is patient able to care for self after discharge? Yes   Who are your caregiver(s) and their phone number(s)? Lorenza Dawn (spouse) [phone]   Patient's perception of discharge disposition home or selfcare   Readmission Within the Last 30 Days no previous admission in last 30 days   Patient currently being followed by outpatient case management? No   Patient currently receives any other outside agency services? Yes   Name and contact number of agency or person providing outside services [facility] Tue, Thur, Sat   Is it the patient/care giver preference to resume care with the current outside agency? Yes   Equipment Currently Used at Home rollator   Do you have any problems affording any of your prescribed medications? No   Does the patient have transportation home? Yes   Transportation Anticipated family or friend will provide   Dialysis Name and Scheduled days [facility] Tue, Thur, Sat   Does the patient receive services at the [facility]? No   Discharge Plan A Home with family   Discharge Plan B Home Health   DME Needed Upon Discharge  none   Patient/Family in Agreement with Plan yes     Met with pt at bedside. Pt reports that he lives at home with his wife. Pt uses rollator to assist with ambulation. Pt is able to preform ADLs independently at home. Pt receives medication mail order, but chose [facility] delivery of medication. No identified CM needs at present, pt may benefit form  post d/c.

## 2019-10-28 NOTE — ASSESSMENT & PLAN NOTE
"-Initial troponin 0.099, continue to trend serial troponin  -Repeat pending  -ECHO pending  -Reports recent PCI in July in [place]  -Recent LHC in [place] and reports "normal" per patient  -NO chest pain on exam today  -BP extremely uncontrolled, medications adjusted today        "

## 2019-10-29 PROBLEM — I16.0 HYPERTENSIVE URGENCY, MALIGNANT: Status: ACTIVE | Noted: 2019-10-29

## 2019-10-29 LAB
ALBUMIN SERPL BCP-MCNC: 3.1 G/DL (ref 3.5–5.2)
ANION GAP SERPL CALC-SCNC: 16 MMOL/L (ref 8–16)
BASOPHILS # BLD AUTO: 0.02 K/UL (ref 0–0.2)
BASOPHILS NFR BLD: 0.4 % (ref 0–1.9)
BUN SERPL-MCNC: 72 MG/DL (ref 6–20)
CALCIUM SERPL-MCNC: 9 MG/DL (ref 8.7–10.5)
CHLORIDE SERPL-SCNC: 98 MMOL/L (ref 95–110)
CO2 SERPL-SCNC: 24 MMOL/L (ref 23–29)
CREAT SERPL-MCNC: 12.7 MG/DL (ref 0.5–1.4)
DIFFERENTIAL METHOD: ABNORMAL
EOSINOPHIL # BLD AUTO: 0.4 K/UL (ref 0–0.5)
EOSINOPHIL NFR BLD: 6.6 % (ref 0–8)
ERYTHROCYTE [DISTWIDTH] IN BLOOD BY AUTOMATED COUNT: 14 % (ref 11.5–14.5)
EST. GFR  (AFRICAN AMERICAN): 5 ML/MIN/1.73 M^2
EST. GFR  (NON AFRICAN AMERICAN): 4 ML/MIN/1.73 M^2
GLUCOSE SERPL-MCNC: 136 MG/DL (ref 70–110)
HAV IGM SERPL QL IA: NEGATIVE
HBV CORE IGM SERPL QL IA: NEGATIVE
HBV SURFACE AG SERPL QL IA: NEGATIVE
HCT VFR BLD AUTO: 23.3 % (ref 40–54)
HCT VFR BLD AUTO: 23.3 % (ref 40–54)
HCV AB SERPL QL IA: NEGATIVE
HGB BLD-MCNC: 7.5 G/DL (ref 14–18)
HGB BLD-MCNC: 7.6 G/DL (ref 14–18)
IMM GRANULOCYTES # BLD AUTO: 0.04 K/UL (ref 0–0.04)
IMM GRANULOCYTES NFR BLD AUTO: 0.7 % (ref 0–0.5)
LYMPHOCYTES # BLD AUTO: 1.1 K/UL (ref 1–4.8)
LYMPHOCYTES NFR BLD: 20.4 % (ref 18–48)
MCH RBC QN AUTO: 28.7 PG (ref 27–31)
MCHC RBC AUTO-ENTMCNC: 32.2 G/DL (ref 32–36)
MCV RBC AUTO: 89 FL (ref 82–98)
MONOCYTES # BLD AUTO: 0.5 K/UL (ref 0.3–1)
MONOCYTES NFR BLD: 8.1 % (ref 4–15)
NEUTROPHILS # BLD AUTO: 3.6 K/UL (ref 1.8–7.7)
NEUTROPHILS NFR BLD: 63.8 % (ref 38–73)
NRBC BLD-RTO: 0 /100 WBC
PHOSPHATE SERPL-MCNC: 8 MG/DL (ref 2.7–4.5)
PLATELET # BLD AUTO: 163 K/UL (ref 150–350)
PMV BLD AUTO: 10.2 FL (ref 9.2–12.9)
POCT GLUCOSE: 118 MG/DL (ref 70–110)
POCT GLUCOSE: 154 MG/DL (ref 70–110)
POCT GLUCOSE: 174 MG/DL (ref 70–110)
POCT GLUCOSE: 175 MG/DL (ref 70–110)
POCT GLUCOSE: 175 MG/DL (ref 70–110)
POTASSIUM SERPL-SCNC: 4.4 MMOL/L (ref 3.5–5.1)
RBC # BLD AUTO: 2.61 M/UL (ref 4.6–6.2)
SODIUM SERPL-SCNC: 138 MMOL/L (ref 136–145)
WBC # BLD AUTO: 5.59 K/UL (ref 3.9–12.7)

## 2019-10-29 PROCEDURE — 63600175 PHARM REV CODE 636 W HCPCS: Performed by: NURSE PRACTITIONER

## 2019-10-29 PROCEDURE — 25000003 PHARM REV CODE 250: Performed by: NURSE PRACTITIONER

## 2019-10-29 PROCEDURE — 85018 HEMOGLOBIN: CPT

## 2019-10-29 PROCEDURE — 80069 RENAL FUNCTION PANEL: CPT

## 2019-10-29 PROCEDURE — 85014 HEMATOCRIT: CPT

## 2019-10-29 PROCEDURE — 85025 COMPLETE CBC W/AUTO DIFF WBC: CPT

## 2019-10-29 PROCEDURE — 99233 PR SUBSEQUENT HOSPITAL CARE,LEVL III: ICD-10-PCS | Mod: ,,, | Performed by: INTERNAL MEDICINE

## 2019-10-29 PROCEDURE — 25000003 PHARM REV CODE 250: Performed by: FAMILY MEDICINE

## 2019-10-29 PROCEDURE — 36415 COLL VENOUS BLD VENIPUNCTURE: CPT

## 2019-10-29 PROCEDURE — 20000000 HC ICU ROOM

## 2019-10-29 PROCEDURE — 25000003 PHARM REV CODE 250: Performed by: INTERNAL MEDICINE

## 2019-10-29 PROCEDURE — 99291 PR CRITICAL CARE, E/M 30-74 MINUTES: ICD-10-PCS | Mod: ,,, | Performed by: INTERNAL MEDICINE

## 2019-10-29 PROCEDURE — 80100014 HC HEMODIALYSIS 1:1

## 2019-10-29 PROCEDURE — 99291 CRITICAL CARE FIRST HOUR: CPT | Mod: ,,, | Performed by: INTERNAL MEDICINE

## 2019-10-29 PROCEDURE — 99233 SBSQ HOSP IP/OBS HIGH 50: CPT | Mod: ,,, | Performed by: INTERNAL MEDICINE

## 2019-10-29 RX ORDER — AMLODIPINE BESYLATE 10 MG/1
10 TABLET ORAL DAILY
Status: DISCONTINUED | OUTPATIENT
Start: 2019-10-29 | End: 2019-10-30 | Stop reason: HOSPADM

## 2019-10-29 RX ORDER — CLONIDINE 0.3 MG/24H
1 PATCH, EXTENDED RELEASE TRANSDERMAL
Status: DISCONTINUED | OUTPATIENT
Start: 2019-10-29 | End: 2019-10-30 | Stop reason: HOSPADM

## 2019-10-29 RX ORDER — HYDRALAZINE HYDROCHLORIDE 50 MG/1
100 TABLET, FILM COATED ORAL EVERY 8 HOURS
Status: DISCONTINUED | OUTPATIENT
Start: 2019-10-29 | End: 2019-10-30 | Stop reason: HOSPADM

## 2019-10-29 RX ADMIN — CLOPIDOGREL BISULFATE 75 MG: 75 TABLET ORAL at 08:10

## 2019-10-29 RX ADMIN — FAMOTIDINE 20 MG: 20 TABLET ORAL at 08:10

## 2019-10-29 RX ADMIN — HYDRALAZINE HYDROCHLORIDE 100 MG: 50 TABLET, FILM COATED ORAL at 01:10

## 2019-10-29 RX ADMIN — HYDRALAZINE HYDROCHLORIDE 75 MG: 50 TABLET, FILM COATED ORAL at 05:10

## 2019-10-29 RX ADMIN — EPOETIN ALFA-EPBX 10000 UNITS: 10000 INJECTION, SOLUTION INTRAVENOUS; SUBCUTANEOUS at 01:10

## 2019-10-29 RX ADMIN — SILVER SULFADIAZINE: 10 CREAM TOPICAL at 08:10

## 2019-10-29 RX ADMIN — HYDROCODONE BITARTRATE AND ACETAMINOPHEN 1 TABLET: 10; 325 TABLET ORAL at 07:10

## 2019-10-29 RX ADMIN — GABAPENTIN 300 MG: 300 CAPSULE ORAL at 09:10

## 2019-10-29 RX ADMIN — ASPIRIN 81 MG: 81 TABLET, COATED ORAL at 08:10

## 2019-10-29 RX ADMIN — NICARDIPINE HYDROCHLORIDE 2.5 MG/HR: 0.2 INJECTION, SOLUTION INTRAVENOUS at 05:10

## 2019-10-29 RX ADMIN — LISINOPRIL 40 MG: 20 TABLET ORAL at 08:10

## 2019-10-29 RX ADMIN — CARVEDILOL 25 MG: 12.5 TABLET, FILM COATED ORAL at 04:10

## 2019-10-29 RX ADMIN — LOPERAMIDE HYDROCHLORIDE 2 MG: 2 CAPSULE ORAL at 03:10

## 2019-10-29 RX ADMIN — CLONIDINE 1 PATCH: 0.3 PATCH TRANSDERMAL at 10:10

## 2019-10-29 RX ADMIN — TRAMADOL HYDROCHLORIDE 50 MG: 50 TABLET, FILM COATED ORAL at 10:10

## 2019-10-29 RX ADMIN — ONDANSETRON 4 MG: 4 TABLET, ORALLY DISINTEGRATING ORAL at 05:10

## 2019-10-29 RX ADMIN — HYDRALAZINE HYDROCHLORIDE 100 MG: 50 TABLET, FILM COATED ORAL at 10:10

## 2019-10-29 RX ADMIN — AMLODIPINE BESYLATE 10 MG: 10 TABLET ORAL at 08:10

## 2019-10-29 RX ADMIN — ATORVASTATIN CALCIUM 80 MG: 40 TABLET, FILM COATED ORAL at 09:10

## 2019-10-29 RX ADMIN — CARVEDILOL 25 MG: 12.5 TABLET, FILM COATED ORAL at 08:10

## 2019-10-29 RX ADMIN — ISOSORBIDE MONONITRATE 30 MG: 30 TABLET, EXTENDED RELEASE ORAL at 08:10

## 2019-10-29 RX ADMIN — ONDANSETRON 4 MG: 4 TABLET, ORALLY DISINTEGRATING ORAL at 04:10

## 2019-10-29 RX ADMIN — ONDANSETRON 4 MG: 4 TABLET, ORALLY DISINTEGRATING ORAL at 10:10

## 2019-10-29 NOTE — ASSESSMENT & PLAN NOTE
Likely anemia of chronic disease related to ESRD  Type /cross match and transfuse 1 unit PrBCs  Monitor for hematochezia, hematemesis and melena  Serial H & H every 8 hours    10/28  H & H currently stable   Monitor     10/29:  Stable  Currently no need to transfuse

## 2019-10-29 NOTE — HPI
"44 year old obese male in [place] visiting family (resides in [place]) with extensive PMH including DM, ESRD, HTN, PCI for CAD this summer with follow up [facility] reported "normal" recently  TTS dialysis schedule at baseline, dialyzed last on 10/25 Friday in [place], [place]  Presented to ED on Doug 10/27 with CP, SOB, vomiting  Labs revealed anemia, hyperglycemia, BNP 2734, troponin 0.099  BP on presentation 213/101  Admitted for observation by hospital medicine  SBP improved to 180s but by morning back to >200 along with return of CP  Transferred to ICU for continuous IV infusion to optimize control of BP  "

## 2019-10-29 NOTE — ASSESSMENT & PLAN NOTE
cardene infusion initiated  Continue coreg (adjusted to home dose), clonidine patch, hydralazine (dose escalated), lisinopril, norvasc (consider escalation of dose in am if control improves on cardene infusion)  ICU hemodynamic monitoring

## 2019-10-29 NOTE — PROGRESS NOTES
[facility] -   Nephrology  Progress Note    Patient Name: Brody Robins  MRN: [id number]  Admission Date: 10/27/2019  Hospital Length of Stay: 1 days  Attending Provider: Ankit Mesa MD   Primary Care Physician: Primary Doctor No  Principal Problem:Hypertensive urgency    Subjective:     HPI: 44 year old male with ESRD, anemia, hyperparathyroidism, DM2, gastroparesis, CAD, HTN presents to [facility] with vomiting and chest pain.     Nephrology was consulted to help with patient's renal care while he is admitted at [facility]. I saw and examined patient in his hospital room. Patient reports that he experienced nausea/vomiting and chest pain on day of admission. He also reports chronic abdominal pain due to gastroparesis. He reports right BKA due to diabetic complications.     Patient dialyzes on schedule TTS at [place], [place] under care of Dr. Adriana Saavedra. Access: left arm AVF.  He was last dialyzed at [facility] on Friday (patient is visiting from [place], [place]).       Interval History:     10/28/19: patient reports intermittent nausea and abdominal pain.     10/29/19: patient was transferred to ICU overnight because of hypertensive urgency. He reports mild chest tightness, no other issues at present.             Review of patient's allergies indicates:   Allergen Reactions    Morphine      Current Facility-Administered Medications   Medication Frequency    0.9%  NaCl infusion (for blood administration) Q24H PRN    0.9%  NaCl infusion PRN    acetaminophen tablet 650 mg Q6H PRN    amLODIPine tablet 10 mg Daily    aspirin EC tablet 81 mg Daily    atorvastatin tablet 80 mg QHS    carvedilol tablet 25 mg BID WM    cloNIDine 0.3 mg/24 hr td ptwk 1 patch Q7 Days    clopidogrel tablet 75 mg Daily    dextrose 10% (D10W) Bolus PRN    epoetin barbara-epbx injection 10,000 Units PRN    famotidine tablet 20 mg Daily    gabapentin capsule 300 mg QHS    glucagon (human recombinant) injection 1 mg PRN     heparin (porcine) injection 5,000 Units Q8H    hydrALAZINE injection 10 mg Q6H PRN    hydrALAZINE tablet 100 mg Q8H    HYDROcodone-acetaminophen  mg per tablet 1 tablet Q6H PRN    insulin aspart U-100 pen 0-5 Units Q6H PRN    isosorbide mononitrate 24 hr tablet 30 mg Daily    lisinopril tablet 40 mg Daily    loperamide capsule 2 mg BID PRN    niCARdipine 40 mg/200 mL infusion Continuous    nitroGLYCERIN SL tablet 0.4 mg Q5 Min PRN    ondansetron disintegrating tablet 4 mg QID (AC & HS)    ondansetron injection 4 mg Q8H PRN    promethazine (PHENERGAN) 6.25 mg in dextrose 5 % 50 mL IVPB Q6H PRN    silver sulfADIAZINE 1% cream Daily    traMADol tablet 50 mg Q6H PRN       Objective:     Vital Signs (Most Recent):  Temp: 98.6 °F (37 °C) (10/29/19 0715)  Pulse: 82 (10/29/19 0800)  Resp: 13 (10/29/19 0800)  BP: (!) 157/67 (10/29/19 0800)  SpO2: (!) 92 % (10/29/19 0800)  O2 Device (Oxygen Therapy): room air (10/29/19 0720) Vital Signs (24h Range):  Temp:  [97.7 °F (36.5 °C)-98.8 °F (37.1 °C)] 98.6 °F (37 °C)  Pulse:  [] 82  Resp:  [10-26] 13  SpO2:  [90 %-98 %] 92 %  BP: (112-227)/() 157/67     Weight: 99.1 kg (218 lb 7.6 oz) (10/28/19 0532)  Body mass index is 32.26 kg/m².  Body surface area is 2.2 meters squared.    I/O last 3 completed shifts:  In: 1148.3 [P.O.:550; I.V.:233.3; Blood:315; IV Piggyback:50]  Out: -     Physical Exam   Constitutional: He is oriented to person, place, and time. He appears well-developed. He is cooperative.   HENT:   Head: Normocephalic.   Nose: No rhinorrhea.   Mouth/Throat: Mucous membranes are normal. No oropharyngeal exudate.   Neck: No thyroid mass present.   Cardiovascular: Normal rate, regular rhythm, S1 normal, S2 normal and intact distal pulses.   Pulmonary/Chest: Effort normal. No respiratory distress. He has no wheezes.   Abdominal: Soft. Bowel sounds are normal. He exhibits no distension. There is tenderness. No hernia.   Musculoskeletal: He  exhibits edema.   S/p right BKA.   Trace Left LE edema.    Lymphadenopathy:     He has no cervical adenopathy.   Neurological: He is alert and oriented to person, place, and time.   Skin: Skin is warm and dry. Rash: .lastre.   Psychiatric: He has a normal mood and affect.       Significant Labs:   Lab Results   Component Value Date    CREATININE 12.7 (H) 10/29/2019    BUN 72 (H) 10/29/2019     10/29/2019    K 4.4 10/29/2019    CL 98 10/29/2019    CO2 24 10/29/2019     Lab Results   Component Value Date    CALCIUM 9.0 10/29/2019    PHOS 8.0 (H) 10/29/2019     Lab Results   Component Value Date    ALBUMIN 3.1 (L) 10/29/2019     Lab Results   Component Value Date    WBC 5.59 10/29/2019    HGB 7.5 (L) 10/29/2019    HCT 23.3 (L) 10/29/2019    MCV 89 10/29/2019     10/29/2019           No results for input(s): MG in the last 168 hours.      Significant Imaging:  Imaging Results          X-Ray Chest AP Portable (Final result)  Result time 10/27/19 11:19:22    Final result by Eh Salvador MD (10/27/19 11:19:22)                 Impression:      No acute cardiopulmonary disease.      Electronically signed by: Eh Salvador MD  Date:    10/27/2019  Time:    11:19             Narrative:    EXAMINATION:  XR CHEST AP PORTABLE    CLINICAL HISTORY:  Chest Pain;    TECHNIQUE:  Single frontal view of the chest was performed.    COMPARISON:  None    FINDINGS:  Mild coarsening of bronchovascular markings/COPD.  No pleural effusion or pneumothorax.    The cardiac silhouette is enlarged.  The hilar and mediastinal contours are unremarkable.    Mild thoracic scoliotic curvature convex right with multilevel degenerative changes.                                  Assessment/Plan:     * Hypertensive urgency  Patient was transferred to ICU overnight because of hypertensive urgency. Now on nicardipine drip.   SBP in 150s at present.     Anemia of chronic disease  Patient received 1 unit of PRBC on 10/27. Will monitor with repeat  CBC. Will give Epogen with HD.     ESRD (end stage renal disease)  Patient on dialyzes on TTS schedule at [facility].     Next HD today.     Access: left arm AVF.     Chest pain  As per hospitalist/Cardiology.       Total ICU time: 35 minutes. More than 50% of time was used for chart review and discussing case with ICU team and dialysis staff.      Thank you for your consult. I will follow-up with patient. Please contact us if you have any additional questions.    Phong Titus MD  Nephrology  [facility] [place]

## 2019-10-29 NOTE — ASSESSMENT & PLAN NOTE
"-Initial troponin 0.099, continue to trend serial troponin  -Repeat pending  -ECHO pending  -Reports recent PCI in July in [place]  -Recent LHC in [place] and reports "normal" per patient  -NO chest pain on exam today  -BP extremely uncontrolled, medications adjusted today    10/29/19  -Troponin 0.099>0.074  -Trending down  -Chest pain improved since admission  -Needs better BP control  -Wean cardene gtt as tolerated  -BP meds adjusted  -Continue ASA, Plavix, BB, statin        "

## 2019-10-29 NOTE — ASSESSMENT & PLAN NOTE
Patient received 1 unit of PRBC on 10/27. Will monitor with repeat CBC. Will give Epogen with HD.

## 2019-10-29 NOTE — ASSESSMENT & PLAN NOTE
S/P PCI in June/July in [place]  Resume Plavix due to recent stenting  Continue ASA, Statin, Plavix, BB, ACEi, Norvasc, Hydralazine  Start Imdur daily  ECHO pending  Reassess in AM    10/29/19  -Continue ASA, Plavix, BB, ACEi, Norvasc, Hydralazine  -Meds adjusted this AM for better BP control  -Can increase Imdur if needed  -2D echo showed normal EF

## 2019-10-29 NOTE — PROGRESS NOTES
"[facility] Hospital Medicine  Progress Note    Patient Name: Brody Robins  MRN: [id number]  Patient Class: IP- Inpatient   Admission Date: 10/27/2019  Length of Stay: 1 days  Attending Physician: Ankit Mesa MD  Primary Care Provider: Primary Doctor No        Subjective:     Principal Problem:Hypertensive urgency        HPI:  Pt is a 43 yo male with PMHx of ESRD on HD (dialyzes M,W,F), Diabetes, gastroparesis and CAD. He states he had placement of a coronary stent in July, 2019 and a recent heart cath approx 4 months. The pt states yesterday he started vomiting multiple episodes of greenish emesis. He is able to take his medications but has vomited everything. Also, pt describes chest pain described as stabbing and rates "8" (1-10) that radiates into his left arm and left leg. He denies any SOB but has noted some dizziness and lightheadedness. He denies fever, chills and states he has "diabetic stomach."  He is visiting here and last had dialysis on Friday at [facility] in [place]. Vital signs on arrival Temp 98.4, pulse 97, resp 18, B/P 213/101 and SpO2 = 100 %. Labs show Hgb 7.3/Hct 22.1, platelets 140, anion gap 17, BUN 51, creatinine 10.0, glucose 240, BNP = 2.734 and troponin 0.099. CXR shows Mild coarsening of bronchovascular markings/COPD.  Also, enlarged cardiac silhouette. Pt is placed on Observation due to anemia and further evaluation of chest pain and likely gastroparesis.         Overview/Hospital Course:  Mr Robins is a 44 year old who presented to [facility] on yesterday with complaints of multiple episodes of vomiting. Associated symptoms include chest pain radiating to left arm and leg. He reports having Left Heart Cath with stent placement in July 2019. Cardiology consulted, he was started back on ASA, Plavix and blood pressure medications. Blood pressure continued to be elevated after continued treatment. He also was experiencing chest pain radiating to left arm. Case reviewed with " Cardiology and patient was transferred to ICU and started on Tridil drip.     10/29:  Patient was started on cardene drip which was turned off this morning. BP remained stable on PO meds and patient was stepped down from ICU. Plan for DC in am should BP remains stable.     Interval History: Patient denies any issues overnight. Inquired on when he can go home. Is requesting medications to be brought to bedside prior to discharge as he is from out of town.     Review of Systems   Constitutional: Negative for fatigue and fever.   Respiratory: Negative for shortness of breath.    Cardiovascular: Negative for chest pain.   Gastrointestinal: Negative for nausea and vomiting.   Skin: Negative for rash.     Objective:     Vital Signs (Most Recent):  Temp: 98.8 °F (37.1 °C) (10/29/19 1105)  Pulse: 77 (10/29/19 1300)  Resp: 19 (10/29/19 1300)  BP: (!) 150/58 (10/29/19 1300)  SpO2: 98 % (10/29/19 1300) Vital Signs (24h Range):  Temp:  [98.4 °F (36.9 °C)-98.8 °F (37.1 °C)] 98.8 °F (37.1 °C)  Pulse:  [] 77  Resp:  [10-28] 19  SpO2:  [90 %-98 %] 98 %  BP: (111-227)/() 150/58     Weight: 99.1 kg (218 lb 7.6 oz)  Body mass index is 32.26 kg/m².    Intake/Output Summary (Last 24 hours) at 10/29/2019 1312  Last data filed at 10/29/2019 0900  Gross per 24 hour   Intake 728.32 ml   Output --   Net 728.32 ml      Physical Exam   Constitutional: He is oriented to person, place, and time. He appears well-developed and well-nourished. No distress.   HENT:   Head: Normocephalic and atraumatic.   Cardiovascular: Normal rate, regular rhythm and normal heart sounds. Exam reveals no gallop and no friction rub.   No murmur heard.  Pulmonary/Chest: Effort normal and breath sounds normal. No stridor. No respiratory distress. He has no wheezes. He has no rales.   Abdominal: Soft. Bowel sounds are normal. He exhibits no distension and no mass. There is no tenderness. There is no guarding.   Musculoskeletal: He exhibits no edema.    Neurological: He is alert and oriented to person, place, and time. No cranial nerve deficit.   Skin: Skin is warm. No rash noted. He is not diaphoretic.       Significant Labs:   BMP:   Recent Labs   Lab 10/29/19  0330   *      K 4.4   CL 98   CO2 24   BUN 72*   CREATININE 12.7*   CALCIUM 9.0     CBC:   Recent Labs   Lab 10/28/19  1000 10/28/19  2021 10/29/19  0330 10/29/19  0934   WBC 6.17  --  5.59  --    HGB 8.5*  8.5* 8.2* 7.5* 7.6*   HCT 26.3*  26.3* 25.2* 23.3* 23.3*     --  163  --        Significant Imaging: I have reviewed all pertinent imaging results/findings within the past 24 hours.      Assessment/Plan:      * Hypertensive urgency  Has been unable to take antihypertensive due to vomiting x 2 days  Likely contributing to chest pain  Pt stated what his blood pressure meds were but did not know any of his dosages  He said his wife would not bring to the hospital  Amlodipine, Coreg and hydralazine ordered  Prn hydralazine or lopressor for uncontrolled blood pressure    10/28  Transferred to ICU   Titrate Cardene, wean as tolerated   Continue PO BP medications     10/29:  cardene drip was dc this am  bp remained stable  Patient stepped down  Will continue to monitor and if BP stable will dc in am       Coronary artery disease of native artery of native heart with stable angina pectoris  Cardiology following  Continue ASA, Statin, Plavix., Imdur  and B blocker        Gastroparesis  Prn analgesia  Limit prn opiate analgesia due to contraindication - slow of GI motility  Prn antiemetics    10/28  Start Reglan 10 mg IV ACHS   Schedule Zofran SL   Monitor     10/29:  No vomiting reported overnight     Anemia of chronic disease  Likely anemia of chronic disease related to ESRD  Type /cross match and transfuse 1 unit PrBCs  Monitor for hematochezia, hematemesis and melena  Serial H & H every 8 hours    10/28  H & H currently stable   Monitor     10/29:  Stable  Currently no need to  transfuse       Diabetes mellitus, type II  Pt has been vomiting x 2 days  Keep NPO for now  Prn Zofran  accuchecks every 6 hours  Sliding scale insulin      ESRD (end stage renal disease)  Nephrology consult - to resume HD  Has access to left upper arm - + thrill, + bruit  Last dialyzed on Friday, 10/25 at [facility]    10/29:  Dialysis today       Chest pain    Admit to Inpatient  Trasnfer to ICU   Tridil switch to Cardene drip for better BP   Serial troponins 0.099, 0.074  Continue Coreg, ACEI and hydralazine   Start Imdur   Nitrates prn, Oxygen therapy to maintain sats > 92 %   2 D ECHO showed normal LVEF and diastolic dysfunction   Could be having chest pain due to hypertensive urgency    10/29:  Resolved, likely due to uncontrolled BP  Reviewed echo   Cards adjusted meds           VTE Risk Mitigation (From admission, onward)         Ordered     heparin (porcine) injection 5,000 Units  Every 8 hours      10/27/19 1326                Critical care time spent on the evaluation and treatment of severe organ dysfunction, review of pertinent labs and imaging studies, discussions with consulting providers and discussions with patient/family: 31 minutes.      Ankit Mesa MD  Department of Hospital Medicine   [facility]

## 2019-10-29 NOTE — SUBJECTIVE & OBJECTIVE
History reviewed. No pertinent past medical history.    History reviewed. No pertinent surgical history.    Review of patient's allergies indicates:   Allergen Reactions    Morphine        Family History     None        Tobacco Use    Smoking status: Never Smoker    Smokeless tobacco: Never Used   Substance and Sexual Activity    Alcohol use: Never     Frequency: Never    Drug use: Never    Sexual activity: Not Currently         Review of Systems   Constitutional: Negative for chills, diaphoresis and fever.   HENT: Negative for congestion.    Respiratory: Negative for chest tightness, shortness of breath and wheezing.    Cardiovascular: Positive for chest pain. Negative for palpitations and leg swelling.   Gastrointestinal: Negative for abdominal pain, diarrhea (none since admit), nausea (none on exam) and vomiting (none since arrival to ICU).        Endorses frequent baseline diarrhea, reports use of loperamide at least 4 days / week   Musculoskeletal: Positive for myalgias.   Skin: Positive for wound (rt stump ).   Neurological: Negative for seizures and speech difficulty.   Psychiatric/Behavioral: The patient is not nervous/anxious.      Objective:     Vital Signs (Most Recent):  Temp: 98.4 °F (36.9 °C) (10/28/19 1905)  Pulse: 86 (10/28/19 1945)  Resp: (!) 26 (10/28/19 1945)  BP: (!) 143/66 (10/28/19 1945)  SpO2: 96 % (10/28/19 1945) Vital Signs (24h Range):  Temp:  [96.4 °F (35.8 °C)-99.3 °F (37.4 °C)] 98.4 °F (36.9 °C)  Pulse:  [] 86  Resp:  [12-26] 26  SpO2:  [92 %-99 %] 96 %  BP: (130-227)/() 143/66     Weight: 99.1 kg (218 lb 7.6 oz)  Body mass index is 32.26 kg/m².      Intake/Output Summary (Last 24 hours) at 10/28/2019 2004  Last data filed at 10/28/2019 1505  Gross per 24 hour   Intake 615 ml   Output --   Net 615 ml       Physical Exam   Constitutional: He is oriented to person, place, and time.   HENT:   Head: Atraumatic.   Eyes: Pupils are equal, round, and reactive to light.   Neck:  No JVD present.   Cardiovascular: Normal rate and regular rhythm.   No murmur heard.  Pulmonary/Chest: Effort normal and breath sounds normal. No respiratory distress.   Abdominal: Soft. Bowel sounds are normal. He exhibits no distension.   Musculoskeletal: He exhibits no edema.   Lymphadenopathy:     He has no cervical adenopathy.   Neurological: He is alert and oriented to person, place, and time.   Skin: Skin is warm and dry. Capillary refill takes 2 to 3 seconds.   rt BKA    Vents:  Oxygen Concentration (%): 21 (10/27/19 1908)    Lines/Drains/Airways     Drain                 Hemodialysis AV Fistula Left upper arm -- days          Peripheral Intravenous Line                 Peripheral IV - Single Lumen 10/27/19 1102 22 G Right Wrist 1 day         Peripheral IV - Single Lumen 10/27/19 1212 20 G Right Forearm 1 day                Significant Labs:    CBC/Anemia Profile:  Recent Labs   Lab 10/27/19  1102 10/28/19  1000   WBC 5.47 6.17   HGB 7.3* 8.5*  8.5*   HCT 22.1* 26.3*  26.3*   * 150   MCV 89 90   RDW 13.9 13.9        Chemistries:  Recent Labs   Lab 10/27/19  1102 10/28/19  1000 10/28/19  1045    138 139   K 4.4 4.7 4.7   CL 97 96 97   CO2 24 24 23   BUN 51* 64* 63*   CREATININE 10.0* 11.4* 11.4*   CALCIUM 9.3 9.3 9.5   ALBUMIN 3.6 3.6 3.6  3.6   PROT 7.1 7.3 7.2   BILITOT 0.8 1.0 0.9   ALKPHOS 58 58 59   ALT 6* 6* 7*   AST 10 11 10   PHOS  --   --  7.2*       All pertinent labs within the past 24 hours have been reviewed.    Significant Imaging:   I have reviewed all pertinent imaging results/findings within the past 24 hours.

## 2019-10-29 NOTE — ASSESSMENT & PLAN NOTE
Patient on dialyzes on TTS schedule at [facility].     Next HD today.     Access: left arm AVF.

## 2019-10-29 NOTE — SUBJECTIVE & OBJECTIVE
Interval History:     10/28/19: patient reports intermittent nausea and abdominal pain.     10/29/19: patient was transferred to ICU overnight because of hypertensive urgency. He reports mild chest tightness, no other issues at present.             Review of patient's allergies indicates:   Allergen Reactions    Morphine      Current Facility-Administered Medications   Medication Frequency    0.9%  NaCl infusion (for blood administration) Q24H PRN    0.9%  NaCl infusion PRN    acetaminophen tablet 650 mg Q6H PRN    amLODIPine tablet 10 mg Daily    aspirin EC tablet 81 mg Daily    atorvastatin tablet 80 mg QHS    carvedilol tablet 25 mg BID WM    cloNIDine 0.3 mg/24 hr td ptwk 1 patch Q7 Days    clopidogrel tablet 75 mg Daily    dextrose 10% (D10W) Bolus PRN    epoetin barbara-epbx injection 10,000 Units PRN    famotidine tablet 20 mg Daily    gabapentin capsule 300 mg QHS    glucagon (human recombinant) injection 1 mg PRN    heparin (porcine) injection 5,000 Units Q8H    hydrALAZINE injection 10 mg Q6H PRN    hydrALAZINE tablet 100 mg Q8H    HYDROcodone-acetaminophen  mg per tablet 1 tablet Q6H PRN    insulin aspart U-100 pen 0-5 Units Q6H PRN    isosorbide mononitrate 24 hr tablet 30 mg Daily    lisinopril tablet 40 mg Daily    loperamide capsule 2 mg BID PRN    niCARdipine 40 mg/200 mL infusion Continuous    nitroGLYCERIN SL tablet 0.4 mg Q5 Min PRN    ondansetron disintegrating tablet 4 mg QID (AC & HS)    ondansetron injection 4 mg Q8H PRN    promethazine (PHENERGAN) 6.25 mg in dextrose 5 % 50 mL IVPB Q6H PRN    silver sulfADIAZINE 1% cream Daily    traMADol tablet 50 mg Q6H PRN       Objective:     Vital Signs (Most Recent):  Temp: 98.6 °F (37 °C) (10/29/19 0715)  Pulse: 82 (10/29/19 0800)  Resp: 13 (10/29/19 0800)  BP: (!) 157/67 (10/29/19 0800)  SpO2: (!) 92 % (10/29/19 0800)  O2 Device (Oxygen Therapy): room air (10/29/19 0720) Vital Signs (24h Range):  Temp:  [97.7 °F (36.5  °C)-98.8 °F (37.1 °C)] 98.6 °F (37 °C)  Pulse:  [] 82  Resp:  [10-26] 13  SpO2:  [90 %-98 %] 92 %  BP: (112-227)/() 157/67     Weight: 99.1 kg (218 lb 7.6 oz) (10/28/19 0532)  Body mass index is 32.26 kg/m².  Body surface area is 2.2 meters squared.    I/O last 3 completed shifts:  In: 1148.3 [P.O.:550; I.V.:233.3; Blood:315; IV Piggyback:50]  Out: -     Physical Exam   Constitutional: He is oriented to person, place, and time. He appears well-developed. He is cooperative.   HENT:   Head: Normocephalic.   Nose: No rhinorrhea.   Mouth/Throat: Mucous membranes are normal. No oropharyngeal exudate.   Neck: No thyroid mass present.   Cardiovascular: Normal rate, regular rhythm, S1 normal, S2 normal and intact distal pulses.   Pulmonary/Chest: Effort normal. No respiratory distress. He has no wheezes.   Abdominal: Soft. Bowel sounds are normal. He exhibits no distension. There is tenderness. No hernia.   Musculoskeletal: He exhibits edema.   S/p right BKA.   Trace Left LE edema.    Lymphadenopathy:     He has no cervical adenopathy.   Neurological: He is alert and oriented to person, place, and time.   Skin: Skin is warm and dry. Rash: .lastre.   Psychiatric: He has a normal mood and affect.       Significant Labs:   Lab Results   Component Value Date    CREATININE 12.7 (H) 10/29/2019    BUN 72 (H) 10/29/2019     10/29/2019    K 4.4 10/29/2019    CL 98 10/29/2019    CO2 24 10/29/2019     Lab Results   Component Value Date    CALCIUM 9.0 10/29/2019    PHOS 8.0 (H) 10/29/2019     Lab Results   Component Value Date    ALBUMIN 3.1 (L) 10/29/2019     Lab Results   Component Value Date    WBC 5.59 10/29/2019    HGB 7.5 (L) 10/29/2019    HCT 23.3 (L) 10/29/2019    MCV 89 10/29/2019     10/29/2019           No results for input(s): MG in the last 168 hours.      Significant Imaging:  Imaging Results          X-Ray Chest AP Portable (Final result)  Result time 10/27/19 11:19:22    Final result by Eh PATEL  MD [place] (10/27/19 11:19:22)                 Impression:      No acute cardiopulmonary disease.      Electronically signed by: Eh Salvador MD  Date:    10/27/2019  Time:    11:19             Narrative:    EXAMINATION:  XR CHEST AP PORTABLE    CLINICAL HISTORY:  Chest Pain;    TECHNIQUE:  Single frontal view of the chest was performed.    COMPARISON:  None    FINDINGS:  Mild coarsening of bronchovascular markings/COPD.  No pleural effusion or pneumothorax.    The cardiac silhouette is enlarged.  The hilar and mediastinal contours are unremarkable.    Mild thoracic scoliotic curvature convex right with multilevel degenerative changes.

## 2019-10-29 NOTE — ASSESSMENT & PLAN NOTE
-BP continues to be extremely uncontrolled  -Continue Norvasc, BB, Hydralazine  -Resume home Lisinopril today  -Needs aggressive BP management  -Troponin pending  -Monitor BP trend and adjust as needed    10/29/19  -BP improved since admission  -Wean cardene gtt as tolerated  -Clonidine 0.3 mg patch started  -Hydralazine increased to 100 mg TID  -Continue amlodipine, Coreg, ACEi, Imdur  -Can consider increasing Imdur if needed as well.

## 2019-10-29 NOTE — PLAN OF CARE
POC discussed w/patient, verbalized understanding. Aware of plan for HD this AM. NSR on monitor. BP controlled w/Cardene gtt @ 2.5mg/hr. Pt still c/o CP 4/10 pain, no longer radiating to arm, however pt is resting calmly and was able to sleep easily most of night w/no complaints. Tolerating clear liq diet; mild, intermittent nausea that is tolerable per pt. Anuric. Patient turns independently in bed. Fall precautions in place, bed alarm on.

## 2019-10-29 NOTE — HOSPITAL COURSE
Transferring NP initiated Tridil infusion on arrival to ICU with additional goal of CP control, no improvement in BP or CP on tridil prompted transition to cardene which has quickly improved BP trend, Mr Dany continues to endorse CP but improved from previous 10/10 to 7/10 rating, he requests change of analgesic and reports that dilaudid is typically helpful for his pain; we discussed medical teams concerns for CP being s/t hypertension along with concern for opiate analgesia in setting of gastroparesis, nausea, and vomiting; plan maintain current analgesia, optimize BP control, and re-evaluate  10/29 seen and examined, off nicardipine drip , BP decreased while on HD today. On roomn air , afebrile. No chest pain.

## 2019-10-29 NOTE — SUBJECTIVE & OBJECTIVE
Interval History: Patient denies any issues overnight. Inquired on when he can go home. Is requesting medications to be brought to bedside prior to discharge as he is from out of town.     Review of Systems   Constitutional: Negative for fatigue and fever.   Respiratory: Negative for shortness of breath.    Cardiovascular: Negative for chest pain.   Gastrointestinal: Negative for nausea and vomiting.   Skin: Negative for rash.     Objective:     Vital Signs (Most Recent):  Temp: 98.8 °F (37.1 °C) (10/29/19 1105)  Pulse: 77 (10/29/19 1300)  Resp: 19 (10/29/19 1300)  BP: (!) 150/58 (10/29/19 1300)  SpO2: 98 % (10/29/19 1300) Vital Signs (24h Range):  Temp:  [98.4 °F (36.9 °C)-98.8 °F (37.1 °C)] 98.8 °F (37.1 °C)  Pulse:  [] 77  Resp:  [10-28] 19  SpO2:  [90 %-98 %] 98 %  BP: (111-227)/() 150/58     Weight: 99.1 kg (218 lb 7.6 oz)  Body mass index is 32.26 kg/m².    Intake/Output Summary (Last 24 hours) at 10/29/2019 1312  Last data filed at 10/29/2019 0900  Gross per 24 hour   Intake 728.32 ml   Output --   Net 728.32 ml      Physical Exam   Constitutional: He is oriented to person, place, and time. He appears well-developed and well-nourished. No distress.   HENT:   Head: Normocephalic and atraumatic.   Cardiovascular: Normal rate, regular rhythm and normal heart sounds. Exam reveals no gallop and no friction rub.   No murmur heard.  Pulmonary/Chest: Effort normal and breath sounds normal. No stridor. No respiratory distress. He has no wheezes. He has no rales.   Abdominal: Soft. Bowel sounds are normal. He exhibits no distension and no mass. There is no tenderness. There is no guarding.   Musculoskeletal: He exhibits no edema.   Neurological: He is alert and oriented to person, place, and time. No cranial nerve deficit.   Skin: Skin is warm. No rash noted. He is not diaphoretic.       Significant Labs:   BMP:   Recent Labs   Lab 10/29/19  0330   *      K 4.4   CL 98   CO2 24   BUN 72*    CREATININE 12.7*   CALCIUM 9.0     CBC:   Recent Labs   Lab 10/28/19  1000 10/28/19  2021 10/29/19  0330 10/29/19  0934   WBC 6.17  --  5.59  --    HGB 8.5*  8.5* 8.2* 7.5* 7.6*   HCT 26.3*  26.3* 25.2* 23.3* 23.3*     --  163  --        Significant Imaging: I have reviewed all pertinent imaging results/findings within the past 24 hours.

## 2019-10-29 NOTE — ASSESSMENT & PLAN NOTE
Has been unable to take antihypertensive due to vomiting x 2 days  Likely contributing to chest pain  Pt stated what his blood pressure meds were but did not know any of his dosages  He said his wife would not bring to the hospital  Amlodipine, Coreg and hydralazine ordered  Prn hydralazine or lopressor for uncontrolled blood pressure    10/28  Transferred to ICU   Titrate Cardene, wean as tolerated   Continue PO BP medications     10/29:  cardene drip was dc this am  bp remained stable  Patient stepped down  Will continue to monitor and if BP stable will dc in am

## 2019-10-29 NOTE — ASSESSMENT & PLAN NOTE
Trial of reglan with scheduled zofran per primary team  Prn phenergan available  Minimize opioid use

## 2019-10-29 NOTE — PROGRESS NOTES
"[facility] -   Cardiology  Progress Note    Patient Name: Brody Robins  MRN: [id number]  Admission Date: 10/27/2019  Hospital Length of Stay: 1 days  Code Status: No Order   Attending Physician: Ankit Mesa MD   Primary Care Physician: Primary Doctor No  Expected Discharge Date:   Principal Problem:Hypertensive urgency    Subjective:   HPI:  Brody Robins is a 44 year old male who presented to [facility] due to chest pain with associated shortness of breath and vomiting. His current medical conditions include CAD s/p coronary stenting, HTN, ESRD on HD, PRES, DM Type II, Right BKA. ED workup revealed /101, H/H 7.3/22.1, Plts 140, Cr 10, BNP 2734, Troponin 0.099, CXR revealed mild coarsening bronchovascular markings/COPD. He was placed in observation under the care of hospital medicine. Cardiology consulted to assist with medical management. Chart reviewed, patient seen and examined. Patient endorses PCI in June or July in [place] and was started on ASA and Plavix. He underwent LHC 2 weeks ago in [place], [place] at [facility] which patient reports as "normal". He has been having issues with elevated BP for the past several years. Denies chest pain on exam today. No shortness of breath, PHAN or palpitations. Denies any missed HD treatments. He reports that he has been adjusting his HTN meds per primary cardiologist in [place] but his BP remains extremely elevated today on exam. ECHO pending. Medication adjustments made today. Agree with PRBC transfusion today.     Hospital Course:   10/29/19-Patient seen and examined today, sitting up in bed. Being dialyzed. Still has some mild, vague chest pain. Improved. Remains on cardene, being weaned. BP meds adjusted. Labs reviewed. Troponin trending down. H/H 7.5/23.3.        Review of Systems   Constitution: Positive for malaise/fatigue.   HENT: Negative.    Eyes: Negative.    Cardiovascular: Positive for chest pain (atypical).   Respiratory: Negative.  "   Endocrine: Negative.    Hematologic/Lymphatic: Bruises/bleeds easily.   Skin: Negative.    Musculoskeletal: Negative.    Gastrointestinal: Negative.    Genitourinary: Negative.    Neurological: Positive for weakness.   Psychiatric/Behavioral: Negative.    Allergic/Immunologic: Negative.      Objective:     Vital Signs (Most Recent):  Temp: 98.6 °F (37 °C) (10/29/19 0715)  Pulse: 80 (10/29/19 0915)  Resp: (!) 28 (10/29/19 0915)  BP: (!) 151/74 (10/29/19 0915)  SpO2: (!) 94 % (10/29/19 0915) Vital Signs (24h Range):  Temp:  [97.7 °F (36.5 °C)-98.8 °F (37.1 °C)] 98.6 °F (37 °C)  Pulse:  [] 80  Resp:  [10-28] 28  SpO2:  [90 %-98 %] 94 %  BP: (112-227)/() 151/74     Weight: 99.1 kg (218 lb 7.6 oz)  Body mass index is 32.26 kg/m².     SpO2: (!) 94 %  O2 Device (Oxygen Therapy): room air      Intake/Output Summary (Last 24 hours) at 10/29/2019 1019  Last data filed at 10/29/2019 0900  Gross per 24 hour   Intake 728.32 ml   Output --   Net 728.32 ml       Lines/Drains/Airways     Drain                 Hemodialysis AV Fistula Left upper arm -- days          Peripheral Intravenous Line                 Peripheral IV - Single Lumen 10/27/19 1102 22 G Right Wrist 1 day         Peripheral IV - Single Lumen 10/27/19 1212 20 G Right Forearm 1 day                Physical Exam   Constitutional: He is oriented to person, place, and time. He appears well-developed and well-nourished. No distress.   HENT:   Head: Normocephalic and atraumatic.   Eyes: Pupils are equal, round, and reactive to light. Right eye exhibits no discharge. Left eye exhibits no discharge.   Neck: Neck supple. No JVD present.   Cardiovascular: Normal rate, regular rhythm, S1 normal and S2 normal.   No murmur heard.  Pulmonary/Chest: Effort normal and breath sounds normal. No respiratory distress. He has no wheezes. He has no rales.   Abdominal: Soft. He exhibits no distension.   Musculoskeletal: He exhibits no edema.   Neurological: He is alert and  oriented to person, place, and time.   Skin: Skin is warm and dry. He is not diaphoretic. No erythema.   Psychiatric: He has a normal mood and affect. His behavior is normal. Thought content normal.   Nursing note and vitals reviewed.      Significant Labs:   CMP   Recent Labs   Lab 10/27/19  1102 10/28/19  1000 10/28/19  1045 10/29/19  0330    138 139 138   K 4.4 4.7 4.7 4.4   CL 97 96 97 98   CO2 24 24 23 24   * 216* 215* 136*   BUN 51* 64* 63* 72*   CREATININE 10.0* 11.4* 11.4* 12.7*   CALCIUM 9.3 9.3 9.5 9.0   PROT 7.1 7.3 7.2  --    ALBUMIN 3.6 3.6 3.6  3.6 3.1*   BILITOT 0.8 1.0 0.9  --    ALKPHOS 58 58 59  --    AST 10 11 10  --    ALT 6* 6* 7*  --    ANIONGAP 17* 18* 19* 16   ESTGFRAFRICA 7* 6* 6* 5*   EGFRNONAA 6* 5* 5* 4*   , CBC   Recent Labs   Lab 10/27/19  1102 10/28/19  1000 10/28/19  2021 10/29/19  0330 10/29/19  0934   WBC 5.47 6.17  --  5.59  --    HGB 7.3* 8.5*  8.5* 8.2* 7.5* 7.6*   HCT 22.1* 26.3*  26.3* 25.2* 23.3* 23.3*   * 150  --  163  --    , Troponin   Recent Labs   Lab 10/27/19  1102 10/28/19  1000   TROPONINI 0.099* 0.074*    and All pertinent lab results from the last 24 hours have been reviewed.    Significant Imaging: Echocardiogram:   2D echo with color flow doppler:   Results for orders placed or performed during the hospital encounter of 10/27/19   2D echo with color flow doppler   Result Value Ref Range    QEF 55 55 - 65    Diastolic Dysfunction Yes (A)     Mitral Valve Mobility NORMAL     Narrative    Date of Procedure: 10/28/2019        TEST DESCRIPTION   Technical Quality: This is a technically challenging study. There is poor endocardial definition.     Aorta: The aortic root is normal in size, measuring 2.5 cm at sinotubular junction and 2.6 cm at Sinuses of Valsalva. The proximal ascending aorta is normal in size, measuring 2.6 cm across.     Left Atrium: The left atrial volume index is severely enlarged, measuring 56.08 cc/m2.     Left Ventricle: The  left ventricle is normal in size, with an end-diastolic diameter of 5.2 cm, and an end-systolic diameter of 3.8 cm. LV wall thickness is normal, with the septum measuring 1.2 cm and the posterior wall measuring 1.4 cm across. Relative   wall thickness was increased at 0.54, and the LV mass index was increased at 155.9 g/m2 consistent with concentric left ventricular hypertrophy. There are no regional wall motion abnormalities. Left ventricular systolic function appears normal. Visually   estimated ejection fraction is 55-60%. The LV Doppler derived stroke volume equals 59.0 ccs.     Diastolic indices: E wave velocity 1.3 m/s, E/A ratio 1.6,  msec., E/e' ratio(avg) 13. There is pseudonormalization of mitral inflow pattern consistent with significant diastolic dysfunction.     Right Atrium: The right atrium is normal in size, measuring 5.0 cm in length and 3.8 cm in width in the apical view.     Right Ventricle: The right ventricle is normal in size. Global right ventricular systolic function appears normal. Tricuspid annular plane systolic excursion (TAPSE) is 2.0 cm.     Aortic Valve:  The aortic valve is normal in structure with normal leaflet mobility. The mean gradient obtained across the aortic valve is 5 mmHg.     Mitral Valve:  The mitral valve is normal in structure with normal leaflet mobility. The pressure half time is 30 msec. The calculated mitral valve area is 7.33 cm2.     Tricuspid Valve:  The tricuspid valve is normal in structure with normal leaflet mobility.     Pulmonary Valve:  The pulmonic valve is not well seen. There is mild pulmonic regurgitation.     IVC: IVC is normal in size and collapses > 50% with a sniff, suggesting normal right atrial pressure of 3 mmHg.     Intracavitary: There is no evidence of pericardial effusion, intracavity mass, thrombi, or vegetation.         CONCLUSIONS     1 - Normal left ventricular systolic function (EF 55-60%).     2 - Impaired LV relaxation, elevated  LAP (grade 2 diastolic dysfunction).     3 - No wall motion abnormalities.     4 - Concentric hypertrophy.     5 - Severe left atrial enlargement.     6 - Normal right ventricular systolic function .     7 - Mild pulmonic regurgitation.             This document has been electronically    SIGNED BY: Jose Elias Ambrocio MD On: 10/28/2019 11:42   , EKG: Reviewed and X-Ray: CXR: X-Ray Chest 1 View (CXR): No results found for this visit on 10/27/19. and X-Ray Chest PA and Lateral (CXR): No results found for this visit on 10/27/19.    Assessment and Plan:   Patient who presents with chest pain in setting of HTN urgency. Improving, wean cardene gtt as tolerated. Meds adjusted. Assess response.     * Hypertensive urgency  -BP continues to be extremely uncontrolled  -Continue Norvasc, BB, Hydralazine  -Resume home Lisinopril today  -Needs aggressive BP management  -Troponin pending  -Monitor BP trend and adjust as needed    10/29/19  -BP improved since admission  -Wean cardene gtt as tolerated  -Clonidine 0.3 mg patch started  -Hydralazine increased to 100 mg TID  -Continue amlodipine, Coreg, ACEi, Imdur  -Can consider increasing Imdur if needed as well.     Coronary artery disease of native artery of native heart with stable angina pectoris  S/P PCI in June/July in [place]  Resume Plavix due to recent stenting  Continue ASA, Statin, Plavix, BB, ACEi, Norvasc, Hydralazine  Start Imdur daily  ECHO pending  Reassess in AM    10/29/19  -Continue ASA, Plavix, BB, ACEi, Norvasc, Hydralazine  -Meds adjusted this AM for better BP control  -Can increase Imdur if needed  -2D echo showed normal EF    Anemia of chronic disease  -H/H 7.5/23.3  -Transfusion planned for today    Diabetes mellitus, type II  -mgmt per primary team    ESRD (end stage renal disease)  -continue HD per nephrology    Chest pain  -Initial troponin 0.099, continue to trend serial troponin  -Repeat pending  -ECHO pending  -Reports recent PCI in July in [place]  -Recent  "[facility] in [place] and reports "normal" per patient  -NO chest pain on exam today  -BP extremely uncontrolled, medications adjusted today    10/29/19  -Troponin 0.099>0.074  -Trending down  -Chest pain improved since admission  -Needs better BP control  -Wean cardene gtt as tolerated  -BP meds adjusted  -Continue ASA, Plavix, BB, statin              VTE Risk Mitigation (From admission, onward)         Ordered     heparin (porcine) injection 5,000 Units  Every 8 hours      10/27/19 2236                Alexia Trejo PA-C  Cardiology  [facility]  "

## 2019-10-29 NOTE — NURSING
"Patient monitored and treated throughout shift, while on telemetry unit, for elevated blood pressure readings and nausea and vomiting. Discussed importance of lab-work with patient and patient's wife; patient agreed to have labs drawn this AM. Nausea treated with scheduled IV famotidine and PRN IV zofran. Patient informed that IV phenergan was available should the zofran not relieve he nausea and vomiting; patient verbalized understanding. ANGELA Mendez, informed of patient's elevated blood pressure readings and of available medication options. Patient treated with scheduled blood pressure medications as directed before progressing to IV hydralazine. At approximately 1320, patient reported 8/10 "constant," "aching" left-sided chest pain and left-sided arm pain. Blood pressure reading 242/113 with a pulse of 102. Nimesh Mendez and JAYY Duenas, with Cardiology informed. Sublingual nitro administered x 3; patient reported no relief of  Pain following nitro administration. IV push of 20mg of Hydralazine administered per order. Patient's vitals continued to be monitored with blood pressure readings showing gradual improvement. Patient remained alert and oriented x4. Patient transferred to ICU per Andrea Hernandez.   "

## 2019-10-29 NOTE — ASSESSMENT & PLAN NOTE
Patient was transferred to ICU overnight because of hypertensive urgency. Now on nicardipine drip.   SBP in 150s at present.

## 2019-10-29 NOTE — SUBJECTIVE & OBJECTIVE
Review of Systems   Constitution: Positive for malaise/fatigue.   HENT: Negative.    Eyes: Negative.    Cardiovascular: Positive for chest pain (atypical).   Respiratory: Negative.    Endocrine: Negative.    Hematologic/Lymphatic: Bruises/bleeds easily.   Skin: Negative.    Musculoskeletal: Negative.    Gastrointestinal: Negative.    Genitourinary: Negative.    Neurological: Positive for weakness.   Psychiatric/Behavioral: Negative.    Allergic/Immunologic: Negative.      Objective:     Vital Signs (Most Recent):  Temp: 98.6 °F (37 °C) (10/29/19 0715)  Pulse: 80 (10/29/19 0915)  Resp: (!) 28 (10/29/19 0915)  BP: (!) 151/74 (10/29/19 0915)  SpO2: (!) 94 % (10/29/19 0915) Vital Signs (24h Range):  Temp:  [97.7 °F (36.5 °C)-98.8 °F (37.1 °C)] 98.6 °F (37 °C)  Pulse:  [] 80  Resp:  [10-28] 28  SpO2:  [90 %-98 %] 94 %  BP: (112-227)/() 151/74     Weight: 99.1 kg (218 lb 7.6 oz)  Body mass index is 32.26 kg/m².     SpO2: (!) 94 %  O2 Device (Oxygen Therapy): room air      Intake/Output Summary (Last 24 hours) at 10/29/2019 1019  Last data filed at 10/29/2019 0900  Gross per 24 hour   Intake 728.32 ml   Output --   Net 728.32 ml       Lines/Drains/Airways     Drain                 Hemodialysis AV Fistula Left upper arm -- days          Peripheral Intravenous Line                 Peripheral IV - Single Lumen 10/27/19 1102 22 G Right Wrist 1 day         Peripheral IV - Single Lumen 10/27/19 1212 20 G Right Forearm 1 day                Physical Exam   Constitutional: He is oriented to person, place, and time. He appears well-developed and well-nourished. No distress.   HENT:   Head: Normocephalic and atraumatic.   Eyes: Pupils are equal, round, and reactive to light. Right eye exhibits no discharge. Left eye exhibits no discharge.   Neck: Neck supple. No JVD present.   Cardiovascular: Normal rate, regular rhythm, S1 normal and S2 normal.   No murmur heard.  Pulmonary/Chest: Effort normal and breath sounds  normal. No respiratory distress. He has no wheezes. He has no rales.   Abdominal: Soft. He exhibits no distension.   Musculoskeletal: He exhibits no edema.   Neurological: He is alert and oriented to person, place, and time.   Skin: Skin is warm and dry. He is not diaphoretic. No erythema.   Psychiatric: He has a normal mood and affect. His behavior is normal. Thought content normal.   Nursing note and vitals reviewed.      Significant Labs:   CMP   Recent Labs   Lab 10/27/19  1102 10/28/19  1000 10/28/19  1045 10/29/19  0330    138 139 138   K 4.4 4.7 4.7 4.4   CL 97 96 97 98   CO2 24 24 23 24   * 216* 215* 136*   BUN 51* 64* 63* 72*   CREATININE 10.0* 11.4* 11.4* 12.7*   CALCIUM 9.3 9.3 9.5 9.0   PROT 7.1 7.3 7.2  --    ALBUMIN 3.6 3.6 3.6  3.6 3.1*   BILITOT 0.8 1.0 0.9  --    ALKPHOS 58 58 59  --    AST 10 11 10  --    ALT 6* 6* 7*  --    ANIONGAP 17* 18* 19* 16   ESTGFRAFRICA 7* 6* 6* 5*   EGFRNONAA 6* 5* 5* 4*   , CBC   Recent Labs   Lab 10/27/19  1102 10/28/19  1000 10/28/19  2021 10/29/19  0330 10/29/19  0934   WBC 5.47 6.17  --  5.59  --    HGB 7.3* 8.5*  8.5* 8.2* 7.5* 7.6*   HCT 22.1* 26.3*  26.3* 25.2* 23.3* 23.3*   * 150  --  163  --    , Troponin   Recent Labs   Lab 10/27/19  1102 10/28/19  1000   TROPONINI 0.099* 0.074*    and All pertinent lab results from the last 24 hours have been reviewed.    Significant Imaging: Echocardiogram:   2D echo with color flow doppler:   Results for orders placed or performed during the hospital encounter of 10/27/19   2D echo with color flow doppler   Result Value Ref Range    QEF 55 55 - 65    Diastolic Dysfunction Yes (A)     Mitral Valve Mobility NORMAL     Narrative    Date of Procedure: 10/28/2019        TEST DESCRIPTION   Technical Quality: This is a technically challenging study. There is poor endocardial definition.     Aorta: The aortic root is normal in size, measuring 2.5 cm at sinotubular junction and 2.6 cm at Sinuses of Valsalva.  The proximal ascending aorta is normal in size, measuring 2.6 cm across.     Left Atrium: The left atrial volume index is severely enlarged, measuring 56.08 cc/m2.     Left Ventricle: The left ventricle is normal in size, with an end-diastolic diameter of 5.2 cm, and an end-systolic diameter of 3.8 cm. LV wall thickness is normal, with the septum measuring 1.2 cm and the posterior wall measuring 1.4 cm across. Relative   wall thickness was increased at 0.54, and the LV mass index was increased at 155.9 g/m2 consistent with concentric left ventricular hypertrophy. There are no regional wall motion abnormalities. Left ventricular systolic function appears normal. Visually   estimated ejection fraction is 55-60%. The LV Doppler derived stroke volume equals 59.0 ccs.     Diastolic indices: E wave velocity 1.3 m/s, E/A ratio 1.6,  msec., E/e' ratio(avg) 13. There is pseudonormalization of mitral inflow pattern consistent with significant diastolic dysfunction.     Right Atrium: The right atrium is normal in size, measuring 5.0 cm in length and 3.8 cm in width in the apical view.     Right Ventricle: The right ventricle is normal in size. Global right ventricular systolic function appears normal. Tricuspid annular plane systolic excursion (TAPSE) is 2.0 cm.     Aortic Valve:  The aortic valve is normal in structure with normal leaflet mobility. The mean gradient obtained across the aortic valve is 5 mmHg.     Mitral Valve:  The mitral valve is normal in structure with normal leaflet mobility. The pressure half time is 30 msec. The calculated mitral valve area is 7.33 cm2.     Tricuspid Valve:  The tricuspid valve is normal in structure with normal leaflet mobility.     Pulmonary Valve:  The pulmonic valve is not well seen. There is mild pulmonic regurgitation.     IVC: IVC is normal in size and collapses > 50% with a sniff, suggesting normal right atrial pressure of 3 mmHg.     Intracavitary: There is no evidence of  pericardial effusion, intracavity mass, thrombi, or vegetation.         CONCLUSIONS     1 - Normal left ventricular systolic function (EF 55-60%).     2 - Impaired LV relaxation, elevated LAP (grade 2 diastolic dysfunction).     3 - No wall motion abnormalities.     4 - Concentric hypertrophy.     5 - Severe left atrial enlargement.     6 - Normal right ventricular systolic function .     7 - Mild pulmonic regurgitation.             This document has been electronically    SIGNED BY: Jose Elias Ambrocio MD On: 10/28/2019 11:42   , EKG: Reviewed and X-Ray: CXR: X-Ray Chest 1 View (CXR): No results found for this visit on 10/27/19. and X-Ray Chest PA and Lateral (CXR): No results found for this visit on 10/27/19.

## 2019-10-29 NOTE — HOSPITAL COURSE
10/29/19-Patient seen and examined today, sitting up in bed. Being dialyzed. Still has some mild, vague chest pain. Improved. Remains on cardene, being weaned. BP meds adjusted. Labs reviewed. Troponin trending down. H/H 7.5/23.3.    10/30/19--Patient seen and examined in ICU, lying in bed. Denies chest pain or SOB today. Feels good today. Would like to go home today. BP remains elevated, medical therapy adjusted today. Recommend PRBC transfusion today given Hgb 7.4. Labs reviewed, K+ 4.8, H/H 7.4/22.7.

## 2019-10-29 NOTE — ASSESSMENT & PLAN NOTE
Prn analgesia  Limit prn opiate analgesia due to contraindication - slow of GI motility  Prn antiemetics    10/28  Start Reglan 10 mg IV ACHS   Schedule Zofran SL   Monitor     10/29:  No vomiting reported overnight

## 2019-10-29 NOTE — ASSESSMENT & PLAN NOTE
HD planned in am  Nephrology following to manage  Strict I/O  Daily wt  10/29 HD . 2 liters removed today

## 2019-10-29 NOTE — PROGRESS NOTES
"[facility] -   Critical Care Medicine  Progress Note    Patient Name: Brody Robins  MRN: [id number]  Admission Date: 10/27/2019  Hospital Length of Stay: 1 days  Code Status: No Order  Attending Provider: Ankit Mesa MD  Primary Care Provider: Primary Doctor No   Principal Problem: Hypertensive urgency    Subjective:     HPI:  44 year old obese male in [facility] family (resides in [place]) with extensive PMH including DM, ESRD, HTN, PCI for CAD this summer with follow up [facility] reported "normal" recently  TTS dialysis schedule at baseline, dialyzed last on 10/25 Friday in [place], [place]  Presented to ED on Doug 10/27 with CP, SOB, vomiting  Labs revealed anemia, hyperglycemia, BNP 2734, troponin 0.099  BP on presentation 213/101  Admitted for observation by hospital medicine  SBP improved to 180s but by morning back to >200 along with return of CP  Transferred to ICU for continuous IV infusion to optimize control of BP    Hospital/ICU Course:  Transferring NP initiated Tridil infusion on arrival to ICU with additional goal of CP control, no improvement in BP or CP on tridil prompted transition to cardene which has quickly improved BP trend, Mr Saenz continues to endorse CP but improved from previous 10/10 to 7/10 rating, he requests change of analgesic and reports that dilaudid is typically helpful for his pain; we discussed medical teams concerns for CP being s/t hypertension along with concern for opiate analgesia in setting of gastroparesis, nausea, and vomiting; plan maintain current analgesia, optimize BP control, and re-evaluate  10/29 seen and examined, off nicardipine drip , BP decreased while on HD today. On roomn air , afebrile. No chest pain.     Interval History:     Review of Systems   Constitutional: Positive for malaise/fatigue.   HENT: Negative for hearing loss.    Eyes: Positive for blurred vision.   Respiratory: Positive for shortness of breath.    Cardiovascular: Positive for " chest pain.   Gastrointestinal: Negative for blood in stool.   Genitourinary: Negative for frequency.   Musculoskeletal: Positive for back pain and joint pain.   Skin: Negative for rash.   Neurological: Negative for seizures.   Endo/Heme/Allergies: Does not bruise/bleed easily.   Psychiatric/Behavioral: Negative for hallucinations.         Objective:     Vital Signs (Most Recent):  Temp: 98.8 °F (37.1 °C) (10/29/19 1500)  Pulse: 80 (10/29/19 1615)  Resp: 17 (10/29/19 1615)  BP: (!) 165/73 (10/29/19 1615)  SpO2: 95 % (10/29/19 1615) Vital Signs (24h Range):  Temp:  [98.4 °F (36.9 °C)-98.8 °F (37.1 °C)] 98.8 °F (37.1 °C)  Pulse:  [] 80  Resp:  [10-28] 17  SpO2:  [90 %-99 %] 95 %  BP: (111-227)/() 165/73     Weight: 99.1 kg (218 lb 7.6 oz)  Body mass index is 32.26 kg/m².      Intake/Output Summary (Last 24 hours) at 10/29/2019 1635  Last data filed at 10/29/2019 1520  Gross per 24 hour   Intake 1717.7 ml   Output 2000 ml   Net -282.3 ml       Physical Exam   Constitutional: He is oriented to person, place, and time. He appears well-developed and well-nourished. No distress.   HENT:   Head: Normocephalic and atraumatic.   Eyes: EOM are normal.   Neck: Neck supple.   Cardiovascular: Normal rate and regular rhythm.   Pulmonary/Chest: Effort normal. No respiratory distress. He has no wheezes.   Abdominal: Soft. There is no tenderness.   Musculoskeletal: He exhibits deformity. He exhibits no edema.   RT BKA  LT AVF UE    Neurological: He is alert and oriented to person, place, and time.   Skin: Skin is warm and dry. There is pallor.   Psychiatric: He has a normal mood and affect. His behavior is normal. Judgment and thought content normal.   Nursing note and vitals reviewed.      Vents:  Oxygen Concentration (%): 21 (10/27/19 1908)    Lines/Drains/Airways     Drain                 Hemodialysis AV Fistula Left upper arm -- days          Peripheral Intravenous Line                 Peripheral IV - Single Lumen  10/27/19 1102 22 G Right Wrist 2 days         Peripheral IV - Single Lumen 10/27/19 1212 20 G Right Forearm 2 days                Significant Labs:    CBC/Anemia Profile:  Recent Labs   Lab 10/28/19  1000 10/28/19  2021 10/29/19  0330 10/29/19  0934   WBC 6.17  --  5.59  --    HGB 8.5*  8.5* 8.2* 7.5* 7.6*   HCT 26.3*  26.3* 25.2* 23.3* 23.3*     --  163  --    MCV 90  --  89  --    RDW 13.9  --  14.0  --         Chemistries:  Recent Labs   Lab 10/28/19  1000 10/28/19  1045 10/29/19  0330    139 138   K 4.7 4.7 4.4   CL 96 97 98   CO2 24 23 24   BUN 64* 63* 72*   CREATININE 11.4* 11.4* 12.7*   CALCIUM 9.3 9.5 9.0   ALBUMIN 3.6 3.6  3.6 3.1*   PROT 7.3 7.2  --    BILITOT 1.0 0.9  --    ALKPHOS 58 59  --    ALT 6* 7*  --    AST 11 10  --    PHOS  --  7.2* 8.0*    Results for SHERIN FRENCH (MRN [id number]) as of 10/29/2019 16:36   Ref. Range 10/27/2019 11:02 10/28/2019 10:00   BNP Latest Ref Range: 0 - 99 pg/mL 2,734 (H)    Troponin I Latest Ref Range: 0.000 - 0.026 ng/mL 0.099 (H) 0.074 (H)       Echo 10/28:      1 - Normal left ventricular systolic function (EF 55-60%).     2 - Impaired LV relaxation, elevated LAP (grade 2 diastolic dysfunction).     3 - No wall motion abnormalities.     4 - Concentric hypertrophy.     5 - Severe left atrial enlargement.     6 - Normal right ventricular systolic function .     7 - Mild pulmonic regurgitation.    Significant Imaging:    CXR: I have reviewed all pertinent results/findings within the past 24 hours and my personal findings are:  no acute findings       ABG  No results for input(s): PH, PO2, PCO2, HCO3, BE in the last 168 hours.  Assessment/Plan:     Cardiac/Vascular  * Hypertensive urgency  cardene infusion initiated  Continue coreg (adjusted to home dose), clonidine patch, hydralazine (dose escalated), lisinopril, norvasc (consider escalation of dose in am if control improves on cardene infusion)  ICU hemodynamic monitoring  10/29 off nicardipine gtt,  continue home anti hypertensives     Coronary artery disease of native artery of native heart with stable angina pectoris  ASA, plavix, statin, Imdur    Renal/  ESRD (end stage renal disease)  HD planned in am  Nephrology following to manage  Strict I/O  Daily wt  10/29 HD . 2 liters removed today    Oncology  Anemia of chronic disease  Repeat cbc in am to eval stability post transfusion    Endocrine  Diabetes mellitus, type II  SSI prn with monitoring for glucose control and prevention of insulin toxicity  10/29 FS Q 6 hrs SSI       GI  Gastroparesis  Trial of reglan with scheduled zofran per primary team  Prn phenergan available  Minimize opioid use    Other  Chest pain  Optimize BP control   Prn nitro  Manage gastroparesis  Minimize opioid analgesia as able       Critical Care Daily Checklist:    A: Awake: RASS Goal/Actual Goal:    Actual: Carter Agitation Sedation Scale (RASS): Alert and calm   B: Spontaneous Breathing Trial Performed?     C: SAT & SBT Coordinated?  NA                      D: Delirium: CAM-ICU Overall CAM-ICU: Negative   E: Early Mobility Performed? Yes   F: Feeding Goal:    Status:     Current Diet Order   Procedures    Diet consistent carbohydrate [facility]; Renal     Order Specific Question:   Indicate patient location for additional diet options:     Answer:   [facility]     Order Specific Question:   Additional Diet Options:     Answer:   Renal      AS: Analgesia/Sedation Not sedated    T: Thromboembolic Prophylaxis Heparin sc    H: HOB > 300 Yes   U: Stress Ulcer Prophylaxis (if needed) Famotidine    G: Glucose Control FS q 6 hrs SSI    B: Bowel Function Stool Occurrence: 1   I: Indwelling Catheter (Lines & Espinoza) Necessity No espinoza    D: De-escalation of Antimicrobials/Pharmacotherapies No abx     Plan for the day/ETD Y    Code Status:  Family/Goals of Care: No Order         Aaliyah Oliver MD  Critical Care Medicine  [facility] -

## 2019-10-29 NOTE — ASSESSMENT & PLAN NOTE
cardene infusion initiated  Continue coreg (adjusted to home dose), clonidine patch, hydralazine (dose escalated), lisinopril, norvasc (consider escalation of dose in am if control improves on cardene infusion)  ICU hemodynamic monitoring  10/29 off nicardipine gtt, continue home anti hypertensives

## 2019-10-29 NOTE — ASSESSMENT & PLAN NOTE
Admit to Inpatient  Trasnfer to ICU   Tridil switch to Cardene drip for better BP   Serial troponins 0.099, 0.074  Continue Coreg, ACEI and hydralazine   Start Imdur   Nitrates prn, Oxygen therapy to maintain sats > 92 %   2 D ECHO showed normal LVEF and diastolic dysfunction   Could be having chest pain due to hypertensive urgency    10/29:  Resolved, likely due to uncontrolled BP  Reviewed echo   Cards adjusted meds

## 2019-10-29 NOTE — NURSING
Pt tolerated 3.5 hr hd tx with 1.5 L net fluid removal. Pt only c/o was of slight dizziness when sbp was 111/69. cardene gtt turned off during hd and bp improved.

## 2019-10-29 NOTE — CONSULTS
"[facility] -   Critical Care Medicine  Consult Note    Patient Name: Brody Robins  MRN: [id number]  Admission Date: 10/27/2019  Hospital Length of Stay: 0 days  Code Status: No Order  Attending Physician: Ankit Mesa MD   Primary Care Provider: Primary Doctor No   Principal Problem: Hypertensive urgency      Subjective:     HPI:  44 year old obese male in [facility] (resides in [place]) with extensive PMH including DM, ESRD, HTN, PCI for CAD this summer with follow up [facility] reported "normal" recently  TTS dialysis schedule at baseline, dialyzed last on 10/25 Friday in [place], [place]  Presented to ED on Doug 10/27 with CP, SOB, vomiting  Labs revealed anemia, hyperglycemia, BNP 2734, troponin 0.099  BP on presentation 213/101  Admitted for observation by hospital medicine  SBP improved to 180s but by morning back to >200 along with return of CP  Transferred to ICU for continuous IV infusion to optimize control of BP    Hospital/ICU Course:  Transferring NP initiated Tridil infusion on arrival to ICU with additional goal of CP control, no improvement in BP or CP on tridil prompted transition to cardene which has quickly improved BP trend, Mr Saenz continues to endorse CP but improved from previous 10/10 to 7/10 rating, he requests change of analgesic and reports that dilaudid is typically helpful for his pain; we discussed medical teams concerns for CP being s/t hypertension along with concern for opiate analgesia in setting of gastroparesis, nausea, and vomiting; plan maintain current analgesia, optimize BP control, and re-evaluate    History reviewed. No pertinent past medical history.    History reviewed. No pertinent surgical history.    Review of patient's allergies indicates:   Allergen Reactions    Morphine        Family History     None        Tobacco Use    Smoking status: Never Smoker    Smokeless tobacco: Never Used   Substance and Sexual Activity    Alcohol use: Never     " Frequency: Never    Drug use: Never    Sexual activity: Not Currently         Review of Systems   Constitutional: Negative for chills, diaphoresis and fever.   HENT: Negative for congestion.    Respiratory: Negative for chest tightness, shortness of breath and wheezing.    Cardiovascular: Positive for chest pain. Negative for palpitations and leg swelling.   Gastrointestinal: Negative for abdominal pain, diarrhea (none since admit), nausea (none on exam) and vomiting (none since arrival to ICU).        Endorses frequent baseline diarrhea, reports use of loperamide at least 4 days / week   Musculoskeletal: Positive for myalgias.   Skin: Positive for wound (rt stump ).   Neurological: Negative for seizures and speech difficulty.   Psychiatric/Behavioral: The patient is not nervous/anxious.      Objective:     Vital Signs (Most Recent):  Temp: 98.4 °F (36.9 °C) (10/28/19 1905)  Pulse: 86 (10/28/19 1945)  Resp: (!) 26 (10/28/19 1945)  BP: (!) 143/66 (10/28/19 1945)  SpO2: 96 % (10/28/19 1945) Vital Signs (24h Range):  Temp:  [96.4 °F (35.8 °C)-99.3 °F (37.4 °C)] 98.4 °F (36.9 °C)  Pulse:  [] 86  Resp:  [12-26] 26  SpO2:  [92 %-99 %] 96 %  BP: (130-227)/() 143/66     Weight: 99.1 kg (218 lb 7.6 oz)  Body mass index is 32.26 kg/m².      Intake/Output Summary (Last 24 hours) at 10/28/2019 2004  Last data filed at 10/28/2019 1505  Gross per 24 hour   Intake 615 ml   Output --   Net 615 ml       Physical Exam   Constitutional: He is oriented to person, place, and time.   HENT:   Head: Atraumatic.   Eyes: Pupils are equal, round, and reactive to light.   Neck: No JVD present.   Cardiovascular: Normal rate and regular rhythm.   No murmur heard.  Pulmonary/Chest: Effort normal and breath sounds normal. No respiratory distress.   Abdominal: Soft. Bowel sounds are normal. He exhibits no distension.   Musculoskeletal: He exhibits no edema.   Lymphadenopathy:     He has no cervical adenopathy.   Neurological: He is  alert and oriented to person, place, and time.   Skin: Skin is warm and dry. Capillary refill takes 2 to 3 seconds.   rt BKA    Vents:  Oxygen Concentration (%): 21 (10/27/19 1908)    Lines/Drains/Airways     Drain                 Hemodialysis AV Fistula Left upper arm -- days          Peripheral Intravenous Line                 Peripheral IV - Single Lumen 10/27/19 1102 22 G Right Wrist 1 day         Peripheral IV - Single Lumen 10/27/19 1212 20 G Right Forearm 1 day                Significant Labs:    CBC/Anemia Profile:  Recent Labs   Lab 10/27/19  1102 10/28/19  1000   WBC 5.47 6.17   HGB 7.3* 8.5*  8.5*   HCT 22.1* 26.3*  26.3*   * 150   MCV 89 90   RDW 13.9 13.9        Chemistries:  Recent Labs   Lab 10/27/19  1102 10/28/19  1000 10/28/19  1045    138 139   K 4.4 4.7 4.7   CL 97 96 97   CO2 24 24 23   BUN 51* 64* 63*   CREATININE 10.0* 11.4* 11.4*   CALCIUM 9.3 9.3 9.5   ALBUMIN 3.6 3.6 3.6  3.6   PROT 7.1 7.3 7.2   BILITOT 0.8 1.0 0.9   ALKPHOS 58 58 59   ALT 6* 6* 7*   AST 10 11 10   PHOS  --   --  7.2*       All pertinent labs within the past 24 hours have been reviewed.    Significant Imaging:   I have reviewed all pertinent imaging results/findings within the past 24 hours.      ABG  No results for input(s): PH, PO2, PCO2, HCO3, BE in the last 168 hours.  Assessment/Plan:     Cardiac/Vascular  * Hypertensive urgency  cardene infusion initiated  Continue coreg (adjusted to home dose), clonidine patch, hydralazine (dose escalated), lisinopril, norvasc (consider escalation of dose in am if control improves on cardene infusion)  ICU hemodynamic monitoring    Coronary artery disease of native artery of native heart with stable angina pectoris  ASA, plavix, statin, Imdur    Renal/  ESRD (end stage renal disease)  HD planned in am  Nephrology following to manage  Strict I/O  Daily wt    Oncology  Anemia of chronic disease  Repeat cbc in am to eval stability post  transfusion    Endocrine  Diabetes mellitus, type II  SSI prn with monitoring for glucose control and prevention of insulin toxicity    GI  Gastroparesis  Trial of reglan with scheduled zofran per primary team  Prn phenergan available  Minimize opioid use    Other  Chest pain  Optimize BP control   Prn nitro  Manage gastroparesis  Minimize opioid analgesia as able        Critical Care Daily Checklist:    A: Awake: RASS Goal/Actual Goal:    Actual:     B: Spontaneous Breathing Trial Performed?     C: SAT & SBT Coordinated?  n/a                      D: Delirium: CAM-ICU     E: Early Mobility Performed? yes   F: Feeding Goal:    Status:     Current Diet Order   Procedures    Diet Clear liquid (no sugar)/Bariatric      AS: Analgesia/Sedation prn   T: Thromboembolic Prophylaxis heparin   H: HOB > 300 Yes   U: Stress Ulcer Prophylaxis (if needed) pepcid   G: Glucose Control SSI   B: Bowel Function     I: Indwelling Catheter (Lines & Romano) Necessity reviewed   D: De-escalation of Antimicrobials/Pharmacotherapies reviewed    Plan for the day/ETD As above      Family/Goals of Care:   Home with family on discharge   I have discussed case and plan of care in detail with Dr Oliver and Dr Mesa; Status and plan of care were discussed with team on multidisciplinary rounds.    Critical Care Time: 40 minutes  Critical secondary to malignant hypertension; Critical care was time spent personally by me on the following activities: development of treatment plan with patient or surrogate and bedside caregivers, discussions with consultants, evaluation of patient's response to treatment, examination of patient, ordering and performing treatments and interventions, ordering and review of laboratory studies, ordering and review of radiographic studies, pulse oximetry, re-evaluation of patient's condition. This critical care time did not overlap with that of any other provider or involve time for any procedures.    Thank you for your  consult.      Rita Ramirez ACNP-BC  Critical Care Medicine  [facility] [place]

## 2019-10-29 NOTE — ASSESSMENT & PLAN NOTE
Nephrology consult - to resume HD  Has access to left upper arm - + thrill, + bruit  Last dialyzed on Friday, 10/25 at [facility]    10/29:  Dialysis today

## 2019-10-29 NOTE — SUBJECTIVE & OBJECTIVE
Interval History:     Review of Systems   Constitutional: Positive for malaise/fatigue.   HENT: Negative for hearing loss.    Eyes: Positive for blurred vision.   Respiratory: Positive for shortness of breath.    Cardiovascular: Positive for chest pain.   Gastrointestinal: Negative for blood in stool.   Genitourinary: Negative for frequency.   Musculoskeletal: Positive for back pain and joint pain.   Skin: Negative for rash.   Neurological: Negative for seizures.   Endo/Heme/Allergies: Does not bruise/bleed easily.   Psychiatric/Behavioral: Negative for hallucinations.         Objective:     Vital Signs (Most Recent):  Temp: 98.8 °F (37.1 °C) (10/29/19 1500)  Pulse: 80 (10/29/19 1615)  Resp: 17 (10/29/19 1615)  BP: (!) 165/73 (10/29/19 1615)  SpO2: 95 % (10/29/19 1615) Vital Signs (24h Range):  Temp:  [98.4 °F (36.9 °C)-98.8 °F (37.1 °C)] 98.8 °F (37.1 °C)  Pulse:  [] 80  Resp:  [10-28] 17  SpO2:  [90 %-99 %] 95 %  BP: (111-227)/() 165/73     Weight: 99.1 kg (218 lb 7.6 oz)  Body mass index is 32.26 kg/m².      Intake/Output Summary (Last 24 hours) at 10/29/2019 1635  Last data filed at 10/29/2019 1520  Gross per 24 hour   Intake 1717.7 ml   Output 2000 ml   Net -282.3 ml       Physical Exam   Constitutional: He is oriented to person, place, and time. He appears well-developed and well-nourished. No distress.   HENT:   Head: Normocephalic and atraumatic.   Eyes: EOM are normal.   Neck: Neck supple.   Cardiovascular: Normal rate and regular rhythm.   Pulmonary/Chest: Effort normal. No respiratory distress. He has no wheezes.   Abdominal: Soft. There is no tenderness.   Musculoskeletal: He exhibits deformity. He exhibits no edema.   RT BKA  LT AVF UE    Neurological: He is alert and oriented to person, place, and time.   Skin: Skin is warm and dry. There is pallor.   Psychiatric: He has a normal mood and affect. His behavior is normal. Judgment and thought content normal.   Nursing note and vitals  reviewed.      Vents:  Oxygen Concentration (%): 21 (10/27/19 1908)    Lines/Drains/Airways     Drain                 Hemodialysis AV Fistula Left upper arm -- days          Peripheral Intravenous Line                 Peripheral IV - Single Lumen 10/27/19 1102 22 G Right Wrist 2 days         Peripheral IV - Single Lumen 10/27/19 1212 20 G Right Forearm 2 days                Significant Labs:    CBC/Anemia Profile:  Recent Labs   Lab 10/28/19  1000 10/28/19  2021 10/29/19  0330 10/29/19  0934   WBC 6.17  --  5.59  --    HGB 8.5*  8.5* 8.2* 7.5* 7.6*   HCT 26.3*  26.3* 25.2* 23.3* 23.3*     --  163  --    MCV 90  --  89  --    RDW 13.9  --  14.0  --         Chemistries:  Recent Labs   Lab 10/28/19  1000 10/28/19  1045 10/29/19  0330    139 138   K 4.7 4.7 4.4   CL 96 97 98   CO2 24 23 24   BUN 64* 63* 72*   CREATININE 11.4* 11.4* 12.7*   CALCIUM 9.3 9.5 9.0   ALBUMIN 3.6 3.6  3.6 3.1*   PROT 7.3 7.2  --    BILITOT 1.0 0.9  --    ALKPHOS 58 59  --    ALT 6* 7*  --    AST 11 10  --    PHOS  --  7.2* 8.0*    Results for SHERIN FRENCH (MRN [id number]) as of 10/29/2019 16:36   Ref. Range 10/27/2019 11:02 10/28/2019 10:00   BNP Latest Ref Range: 0 - 99 pg/mL 2,734 (H)    Troponin I Latest Ref Range: 0.000 - 0.026 ng/mL 0.099 (H) 0.074 (H)       Echo 10/28:      1 - Normal left ventricular systolic function (EF 55-60%).     2 - Impaired LV relaxation, elevated LAP (grade 2 diastolic dysfunction).     3 - No wall motion abnormalities.     4 - Concentric hypertrophy.     5 - Severe left atrial enlargement.     6 - Normal right ventricular systolic function .     7 - Mild pulmonic regurgitation.    Significant Imaging:    CXR: I have reviewed all pertinent results/findings within the past 24 hours and my personal findings are:  no acute findings

## 2019-10-29 NOTE — ASSESSMENT & PLAN NOTE
SSI prn with monitoring for glucose control and prevention of insulin toxicity  10/29 FS Q 6 hrs SSI

## 2019-10-29 NOTE — PLAN OF CARE
Pt aaox3. NSR on monitor. Afebrile. Off cardene since 9am. Room air. Does not make urine. Received HD today - pulled 1.5L. 2 large bowel movements. Tolerating diet, no nausea. PIVs intact. Pt turns/adjusts independently. Bed low, wheels locked, alarms audible, call light within reach, POC reviewed.

## 2019-10-30 VITALS
TEMPERATURE: 98 F | OXYGEN SATURATION: 99 % | BODY MASS INDEX: 32.36 KG/M2 | DIASTOLIC BLOOD PRESSURE: 78 MMHG | WEIGHT: 218.5 LBS | HEART RATE: 73 BPM | SYSTOLIC BLOOD PRESSURE: 178 MMHG | HEIGHT: 69 IN | RESPIRATION RATE: 19 BRPM

## 2019-10-30 LAB
ALBUMIN SERPL BCP-MCNC: 3 G/DL (ref 3.5–5.2)
ANION GAP SERPL CALC-SCNC: 11 MMOL/L (ref 8–16)
BASOPHILS # BLD AUTO: 0.02 K/UL (ref 0–0.2)
BASOPHILS NFR BLD: 0.4 % (ref 0–1.9)
BUN SERPL-MCNC: 36 MG/DL (ref 6–20)
CALCIUM SERPL-MCNC: 8.7 MG/DL (ref 8.7–10.5)
CHLORIDE SERPL-SCNC: 103 MMOL/L (ref 95–110)
CO2 SERPL-SCNC: 24 MMOL/L (ref 23–29)
CREAT SERPL-MCNC: 8.1 MG/DL (ref 0.5–1.4)
DIFFERENTIAL METHOD: ABNORMAL
EOSINOPHIL # BLD AUTO: 0.4 K/UL (ref 0–0.5)
EOSINOPHIL NFR BLD: 6.1 % (ref 0–8)
ERYTHROCYTE [DISTWIDTH] IN BLOOD BY AUTOMATED COUNT: 14.1 % (ref 11.5–14.5)
EST. GFR  (AFRICAN AMERICAN): 8 ML/MIN/1.73 M^2
EST. GFR  (NON AFRICAN AMERICAN): 7 ML/MIN/1.73 M^2
GLUCOSE SERPL-MCNC: 160 MG/DL (ref 70–110)
HCT VFR BLD AUTO: 22.7 % (ref 40–54)
HGB BLD-MCNC: 7.4 G/DL (ref 14–18)
IMM GRANULOCYTES # BLD AUTO: 0.03 K/UL (ref 0–0.04)
IMM GRANULOCYTES NFR BLD AUTO: 0.5 % (ref 0–0.5)
LYMPHOCYTES # BLD AUTO: 1.4 K/UL (ref 1–4.8)
LYMPHOCYTES NFR BLD: 25.1 % (ref 18–48)
MCH RBC QN AUTO: 29.7 PG (ref 27–31)
MCHC RBC AUTO-ENTMCNC: 32.6 G/DL (ref 32–36)
MCV RBC AUTO: 91 FL (ref 82–98)
MONOCYTES # BLD AUTO: 0.5 K/UL (ref 0.3–1)
MONOCYTES NFR BLD: 8.8 % (ref 4–15)
NEUTROPHILS # BLD AUTO: 3.4 K/UL (ref 1.8–7.7)
NEUTROPHILS NFR BLD: 59.1 % (ref 38–73)
NRBC BLD-RTO: 0 /100 WBC
PHOSPHATE SERPL-MCNC: 5.4 MG/DL (ref 2.7–4.5)
PLATELET # BLD AUTO: 140 K/UL (ref 150–350)
PMV BLD AUTO: 9.8 FL (ref 9.2–12.9)
POCT GLUCOSE: 142 MG/DL (ref 70–110)
POTASSIUM SERPL-SCNC: 4.8 MMOL/L (ref 3.5–5.1)
RBC # BLD AUTO: 2.49 M/UL (ref 4.6–6.2)
SODIUM SERPL-SCNC: 138 MMOL/L (ref 136–145)
WBC # BLD AUTO: 5.7 K/UL (ref 3.9–12.7)

## 2019-10-30 PROCEDURE — 99233 PR SUBSEQUENT HOSPITAL CARE,LEVL III: ICD-10-PCS | Mod: ,,, | Performed by: INTERNAL MEDICINE

## 2019-10-30 PROCEDURE — 99291 PR CRITICAL CARE, E/M 30-74 MINUTES: ICD-10-PCS | Mod: ,,, | Performed by: INTERNAL MEDICINE

## 2019-10-30 PROCEDURE — 25000003 PHARM REV CODE 250: Performed by: NURSE PRACTITIONER

## 2019-10-30 PROCEDURE — 25000003 PHARM REV CODE 250: Performed by: INTERNAL MEDICINE

## 2019-10-30 PROCEDURE — 99233 SBSQ HOSP IP/OBS HIGH 50: CPT | Mod: ,,, | Performed by: INTERNAL MEDICINE

## 2019-10-30 PROCEDURE — 85025 COMPLETE CBC W/AUTO DIFF WBC: CPT

## 2019-10-30 PROCEDURE — 80069 RENAL FUNCTION PANEL: CPT

## 2019-10-30 PROCEDURE — 99291 CRITICAL CARE FIRST HOUR: CPT | Mod: ,,, | Performed by: INTERNAL MEDICINE

## 2019-10-30 PROCEDURE — 36415 COLL VENOUS BLD VENIPUNCTURE: CPT

## 2019-10-30 RX ORDER — ISOSORBIDE MONONITRATE 30 MG/1
30 TABLET, EXTENDED RELEASE ORAL 2 TIMES DAILY
Status: DISCONTINUED | OUTPATIENT
Start: 2019-10-30 | End: 2019-10-30 | Stop reason: HOSPADM

## 2019-10-30 RX ORDER — SODIUM CHLORIDE 9 MG/ML
INJECTION, SOLUTION INTRAVENOUS
Status: DISCONTINUED | OUTPATIENT
Start: 2019-10-30 | End: 2019-10-30 | Stop reason: HOSPADM

## 2019-10-30 RX ORDER — HEPARIN SODIUM 1000 [USP'U]/ML
1000 INJECTION, SOLUTION INTRAVENOUS; SUBCUTANEOUS
Status: DISCONTINUED | OUTPATIENT
Start: 2019-10-31 | End: 2019-10-30 | Stop reason: HOSPADM

## 2019-10-30 RX ORDER — SODIUM CHLORIDE 9 MG/ML
INJECTION, SOLUTION INTRAVENOUS ONCE
Status: DISCONTINUED | OUTPATIENT
Start: 2019-10-30 | End: 2019-10-30 | Stop reason: HOSPADM

## 2019-10-30 RX ORDER — MUPIROCIN 20 MG/G
OINTMENT TOPICAL 2 TIMES DAILY
Status: DISCONTINUED | OUTPATIENT
Start: 2019-10-30 | End: 2019-10-30 | Stop reason: HOSPADM

## 2019-10-30 RX ADMIN — HYDRALAZINE HYDROCHLORIDE 100 MG: 50 TABLET, FILM COATED ORAL at 05:10

## 2019-10-30 RX ADMIN — CLOPIDOGREL BISULFATE 75 MG: 75 TABLET ORAL at 09:10

## 2019-10-30 RX ADMIN — CARVEDILOL 25 MG: 12.5 TABLET, FILM COATED ORAL at 09:10

## 2019-10-30 RX ADMIN — FAMOTIDINE 20 MG: 20 TABLET ORAL at 09:10

## 2019-10-30 RX ADMIN — HYDROCODONE BITARTRATE AND ACETAMINOPHEN 1 TABLET: 10; 325 TABLET ORAL at 01:10

## 2019-10-30 RX ADMIN — MUPIROCIN: 20 OINTMENT TOPICAL at 09:10

## 2019-10-30 RX ADMIN — ASPIRIN 81 MG: 81 TABLET, COATED ORAL at 09:10

## 2019-10-30 RX ADMIN — LISINOPRIL 40 MG: 20 TABLET ORAL at 09:10

## 2019-10-30 RX ADMIN — SILVER SULFADIAZINE: 10 CREAM TOPICAL at 09:10

## 2019-10-30 RX ADMIN — AMLODIPINE BESYLATE 10 MG: 10 TABLET ORAL at 09:10

## 2019-10-30 RX ADMIN — ISOSORBIDE MONONITRATE 30 MG: 30 TABLET, EXTENDED RELEASE ORAL at 09:10

## 2019-10-30 NOTE — ASSESSMENT & PLAN NOTE
-BP continues to be extremely uncontrolled  -Continue Norvasc, BB, Hydralazine  -Resume home Lisinopril today  -Needs aggressive BP management  -Troponin pending  -Monitor BP trend and adjust as needed    10/29/19  -BP improved since admission  -Wean cardene gtt as tolerated  -Clonidine 0.3 mg patch started  -Hydralazine increased to 100 mg TID  -Continue amlodipine, Coreg, ACEi, Imdur  -Can consider increasing Imdur if needed as well.     10/30  -Continue current medical therapy  -BP elevated today, Imdur increased to BID  -Reassess and may consider adding Minoxidil

## 2019-10-30 NOTE — ASSESSMENT & PLAN NOTE
Patient was transferred to ICU on 10/28 because of hypertensive urgency. Now off nicardipine drip.   SBP still elevated. Adjust meds to improve BP control.

## 2019-10-30 NOTE — PLAN OF CARE
Pt left AMA, after Dr. Mesa explained the risks of elevated BP; current value 178/78 after receiving 4 meds at 9:30 for BP; pt also refused to sign AMA papers.

## 2019-10-30 NOTE — PLAN OF CARE
No acute events overnight. Pt AAOx4. VSS on RA. Afebrile. Anuric. Tolerating diet. CBGs WNL., Generalized pain controlled with PRN medication. Pt turned independently throughout the night. POC reviewed with pt and family. All questions and concerns addressed. Awaiting Tele bed.

## 2019-10-30 NOTE — PROGRESS NOTES
"[facility] -   Cardiology  Progress Note    Patient Name: Brody Robins  MRN: [id number]  Admission Date: 10/27/2019  Hospital Length of Stay: 2 days  Code Status: No Order   Attending Physician: Ankit Mesa MD   Primary Care Physician: Primary Doctor No  Expected Discharge Date:   Principal Problem:Hypertensive urgency    Subjective:   HPI    Brody Robins is a 44 year old male who presented to [facility] due to chest pain with associated shortness of breath and vomiting. His current medical conditions include CAD s/p coronary stenting, HTN, ESRD on HD, PRES, DM Type II, Right BKA. ED workup revealed /101, H/H 7.3/22.1, Plts 140, Cr 10, BNP 2734, Troponin 0.099, CXR revealed mild coarsening bronchovascular markings/COPD. He was placed in observation under the care of hospital medicine. Cardiology consulted to assist with medical management. Chart reviewed, patient seen and examined. Patient endorses PCI in June or July in [place] and was started on ASA and Plavix. He underwent LHC 2 weeks ago in [place] at [facility] which patient reports as "normal". He has been having issues with elevated BP for the past several years. Denies chest pain on exam today. No shortness of breath, PHNA or palpitations. Denies any missed HD treatments. He reports that he has been adjusting his HTN meds per primary cardiologist in [place] but his BP remains extremely elevated today on exam. ECHO pending. Medication adjustments made today. Agree with PRBC transfusion today.       Hospital Course:   10/29/19-Patient seen and examined today, sitting up in bed. Being dialyzed. Still has some mild, vague chest pain. Improved. Remains on cardene, being weaned. BP meds adjusted. Labs reviewed. Troponin trending down. H/H 7.5/23.3.    10/30/19--Patient seen and examined in ICU, lying in bed. Denies chest pain or SOB today. Feels good today. Would like to go home today. BP remains elevated, medical therapy adjusted today. " Recommend PRBC transfusion today given Hgb 7.4. Labs reviewed, K+ 4.8, H/H 7.4/22.7.     Interval History: no acute issues noted o/n. BP elevated this AM. No chest pain or SOB today on exam. Would recommend PRBC transfusion of 1 unit today given Hgb 7.4.     Review of Systems   Constitution: Positive for malaise/fatigue.   HENT: Negative for hearing loss and hoarse voice.    Eyes: Negative for blurred vision and visual disturbance.   Cardiovascular: Positive for chest pain (resolved) and dyspnea on exertion. Negative for claudication, irregular heartbeat, leg swelling, near-syncope, orthopnea, palpitations, paroxysmal nocturnal dyspnea and syncope.        S/P PCI in June/July   Respiratory: Positive for shortness of breath. Negative for cough, hemoptysis, sleep disturbances due to breathing, snoring and wheezing.    Endocrine: Negative for cold intolerance and heat intolerance.   Hematologic/Lymphatic: Bruises/bleeds easily.   Skin: Negative for color change, dry skin and nail changes.   Musculoskeletal: Positive for arthritis and back pain. Negative for joint pain and myalgias.        S/p Right BKA   Gastrointestinal: Negative for bloating, abdominal pain, constipation, nausea and vomiting.   Genitourinary: Negative for dysuria, flank pain, hematuria and hesitancy.        +ESRD on HD   Neurological: Negative for headaches, light-headedness, loss of balance, numbness, paresthesias and weakness.   Psychiatric/Behavioral: Negative for altered mental status.   Allergic/Immunologic: Negative for environmental allergies.     Objective:     Vital Signs (Most Recent):  Temp: 98.3 °F (36.8 °C) (10/30/19 0305)  Pulse: 72 (10/30/19 0305)  Resp: 10 (10/30/19 0305)  BP: (!) 163/74 (10/30/19 0305)  SpO2: 96 % (10/30/19 0305) Vital Signs (24h Range):  Temp:  [98.2 °F (36.8 °C)-98.9 °F (37.2 °C)] 98.3 °F (36.8 °C)  Pulse:  [70-81] 72  Resp:  [5-28] 10  SpO2:  [94 %-99 %] 96 %  BP: (111-170)/(56-83) 163/74     Weight: 99.1 kg (218  lb 7.6 oz)  Body mass index is 32.26 kg/m².     SpO2: 96 %  O2 Device (Oxygen Therapy): room air      Intake/Output Summary (Last 24 hours) at 10/30/2019 0940  Last data filed at 10/30/2019 0300  Gross per 24 hour   Intake 1279.38 ml   Output 2000 ml   Net -720.62 ml       Lines/Drains/Airways     Drain                 Hemodialysis AV Fistula Left upper arm -- days          Peripheral Intravenous Line                 Peripheral IV - Single Lumen 10/27/19 1102 22 G Right Wrist 2 days         Peripheral IV - Single Lumen 10/27/19 1212 20 G Right Forearm 2 days                Physical Exam   Constitutional: He is oriented to person, place, and time. He appears well-developed and well-nourished. No distress.   HENT:   Head: Normocephalic and atraumatic.   Eyes: Pupils are equal, round, and reactive to light.   Neck: Normal range of motion and full passive range of motion without pain. Neck supple. No JVD present.   Cardiovascular: Normal rate, regular rhythm, S1 normal, S2 normal and intact distal pulses. PMI is not displaced. Exam reveals no distant heart sounds.   No murmur heard.  Pulses:       Radial pulses are 2+ on the right side, and 2+ on the left side.        Dorsalis pedis pulses are 2+ on the left side.   Chest pain free on exam  BP elevated today   Pulmonary/Chest: Effort normal and breath sounds normal. No accessory muscle usage. No respiratory distress. He has no decreased breath sounds. He has no wheezes. He has no rales.   Abdominal: Soft. Bowel sounds are normal. He exhibits no distension. There is no tenderness.   Musculoskeletal: Normal range of motion. He exhibits no edema.        Left ankle: He exhibits swelling.   Neurological: He is alert and oriented to person, place, and time.   Skin: Skin is warm and dry. He is not diaphoretic. No cyanosis. Nails show no clubbing.   Psychiatric: He has a normal mood and affect. His speech is normal and behavior is normal. Judgment and thought content normal.  Cognition and memory are normal.   Nursing note and vitals reviewed.      Significant Labs:   BMP:   Recent Labs   Lab 10/28/19  1045 10/29/19  0330 10/30/19  0339   * 136* 160*    138 138   K 4.7 4.4 4.8   CL 97 98 103   CO2 23 24 24   BUN 63* 72* 36*   CREATININE 11.4* 12.7* 8.1*   CALCIUM 9.5 9.0 8.7   , CBC   Recent Labs   Lab 10/28/19  1000  10/29/19  0330 10/29/19  0934 10/30/19  0339   WBC 6.17  --  5.59  --  5.70   HGB 8.5*  8.5*   < > 7.5* 7.6* 7.4*   HCT 26.3*  26.3*   < > 23.3* 23.3* 22.7*     --  163  --  140*    < > = values in this interval not displayed.   , Troponin   Recent Labs   Lab 10/28/19  1000   TROPONINI 0.074*    and All pertinent lab results from the last 24 hours have been reviewed.    Significant Imaging: Echocardiogram:   2D echo with color flow doppler:   Results for orders placed or performed during the hospital encounter of 10/27/19   2D echo with color flow doppler   Result Value Ref Range    QEF 55 55 - 65    Diastolic Dysfunction Yes (A)     Mitral Valve Mobility NORMAL     Narrative    Date of Procedure: 10/28/2019        TEST DESCRIPTION   Technical Quality: This is a technically challenging study. There is poor endocardial definition.     Aorta: The aortic root is normal in size, measuring 2.5 cm at sinotubular junction and 2.6 cm at Sinuses of Valsalva. The proximal ascending aorta is normal in size, measuring 2.6 cm across.     Left Atrium: The left atrial volume index is severely enlarged, measuring 56.08 cc/m2.     Left Ventricle: The left ventricle is normal in size, with an end-diastolic diameter of 5.2 cm, and an end-systolic diameter of 3.8 cm. LV wall thickness is normal, with the septum measuring 1.2 cm and the posterior wall measuring 1.4 cm across. Relative   wall thickness was increased at 0.54, and the LV mass index was increased at 155.9 g/m2 consistent with concentric left ventricular hypertrophy. There are no regional wall motion  abnormalities. Left ventricular systolic function appears normal. Visually   estimated ejection fraction is 55-60%. The LV Doppler derived stroke volume equals 59.0 ccs.     Diastolic indices: E wave velocity 1.3 m/s, E/A ratio 1.6,  msec., E/e' ratio(avg) 13. There is pseudonormalization of mitral inflow pattern consistent with significant diastolic dysfunction.     Right Atrium: The right atrium is normal in size, measuring 5.0 cm in length and 3.8 cm in width in the apical view.     Right Ventricle: The right ventricle is normal in size. Global right ventricular systolic function appears normal. Tricuspid annular plane systolic excursion (TAPSE) is 2.0 cm.     Aortic Valve:  The aortic valve is normal in structure with normal leaflet mobility. The mean gradient obtained across the aortic valve is 5 mmHg.     Mitral Valve:  The mitral valve is normal in structure with normal leaflet mobility. The pressure half time is 30 msec. The calculated mitral valve area is 7.33 cm2.     Tricuspid Valve:  The tricuspid valve is normal in structure with normal leaflet mobility.     Pulmonary Valve:  The pulmonic valve is not well seen. There is mild pulmonic regurgitation.     IVC: IVC is normal in size and collapses > 50% with a sniff, suggesting normal right atrial pressure of 3 mmHg.     Intracavitary: There is no evidence of pericardial effusion, intracavity mass, thrombi, or vegetation.         CONCLUSIONS     1 - Normal left ventricular systolic function (EF 55-60%).     2 - Impaired LV relaxation, elevated LAP (grade 2 diastolic dysfunction).     3 - No wall motion abnormalities.     4 - Concentric hypertrophy.     5 - Severe left atrial enlargement.     6 - Normal right ventricular systolic function .     7 - Mild pulmonic regurgitation.             This document has been electronically    SIGNED BY: Jose Elias Ambrocio MD On: 10/28/2019 11:42    and X-Ray: CXR: X-Ray Chest 1 View (CXR): No results found for this visit  "on 10/27/19. and X-Ray Chest PA and Lateral (CXR): No results found for this visit on 10/27/19.    Assessment and Plan:     * Hypertensive urgency  -BP continues to be extremely uncontrolled  -Continue Norvasc, BB, Hydralazine  -Resume home Lisinopril today  -Needs aggressive BP management  -Troponin pending  -Monitor BP trend and adjust as needed    10/29/19  -BP improved since admission  -Wean cardene gtt as tolerated  -Clonidine 0.3 mg patch started  -Hydralazine increased to 100 mg TID  -Continue amlodipine, Coreg, ACEi, Imdur  -Can consider increasing Imdur if needed as well.     10/30  -Continue current medical therapy  -BP elevated today, Imdur increased to BID  -Reassess and may consider adding Minoxidil    Coronary artery disease of native artery of native heart with stable angina pectoris  S/P PCI in June/July in [place]  Resume Plavix due to recent stenting  Continue ASA, Statin, Plavix, BB, ACEi, Norvasc, Hydralazine  Start Imdur daily  ECHO pending  Reassess in AM    10/29/19  -Continue ASA, Plavix, BB, ACEi, Norvasc, Hydralazine  -Meds adjusted this AM for better BP control  -Can increase Imdur if needed  -2D echo showed normal EF    Anemia of chronic disease  -H/H 7.5/23.3  -Transfusion planned for today    10/30  -Hgb 7.4  -Recommend Hem/Onc consult  -Recommend PRBC transfusion today    Diabetes mellitus, type II  -mgmt per primary team    ESRD (end stage renal disease)  -continue HD per nephrology    Chest pain  -Initial troponin 0.099, continue to trend serial troponin  -Repeat pending  -ECHO pending  -Reports recent PCI in July in [place]  -Recent LHC in [place] and reports "normal" per patient  -NO chest pain on exam today  -BP extremely uncontrolled, medications adjusted today    10/29/19  -Troponin 0.099>0.074  -Trending down  -Chest pain improved since admission  -Needs better BP control  -Wean cardene gtt as tolerated  -BP meds adjusted  -Continue ASA, Plavix, BB, " statin    10/30/19  -Continue ASA, Statin, Plavix, Norvasc, Hydralazine, Imdur, ACEi  -BP remains elevated today  -Medical therapy adjusted    VTE Risk Mitigation (From admission, onward)         Ordered     heparin (porcine) injection 1,000 Units  As needed (PRN)      10/30/19 0829     heparin (porcine) injection 5,000 Units  Every 8 hours      10/27/19 1326                JAYY Jama-C  Cardiology  [facility]

## 2019-10-30 NOTE — PROGRESS NOTES
[facility] -   Nephrology  Progress Note    Patient Name: Brody Robins  MRN: [id number]  Admission Date: 10/27/2019  Hospital Length of Stay: 2 days  Attending Provider: Ankit Mesa MD   Primary Care Physician: Primary Doctor No  Principal Problem:Hypertensive urgency    Subjective:     HPI: 44 year old male with ESRD, anemia, hyperparathyroidism, DM2, gastroparesis, CAD, HTN presents to [facility] with vomiting and chest pain.     Nephrology was consulted to help with patient's renal care while he is admitted at [facility]. I saw and examined patient in his hospital room. Patient reports that he experienced nausea/vomiting and chest pain on day of admission. He also reports chronic abdominal pain due to gastroparesis. He reports right BKA due to diabetic complications.     Patient dialyzes on schedule TTS at [place], [place] under care of Dr. Adriana Saavedra. Access: left arm AVF.  [facility] (patient is visiting from [place], [place]).       Interval History:     10/28/19: patient reports intermittent nausea and abdominal pain.     10/29/19: patient was transferred to ICU overnight because of hypertensive urgency. He reports mild chest tightness, no other issues at present.     10/30/19: Patient is feeling OK, he denies any complaints.             Review of patient's allergies indicates:   Allergen Reactions    Morphine      Current Facility-Administered Medications   Medication Frequency    0.9%  NaCl infusion PRN    acetaminophen tablet 650 mg Q6H PRN    amLODIPine tablet 10 mg Daily    aspirin EC tablet 81 mg Daily    atorvastatin tablet 80 mg QHS    carvedilol tablet 25 mg BID WM    cloNIDine 0.3 mg/24 hr td ptwk 1 patch Q7 Days    clopidogrel tablet 75 mg Daily    dextrose 10% (D10W) Bolus PRN    epoetin barbara-epbx injection 10,000 Units PRN    famotidine tablet 20 mg Daily    gabapentin capsule 300 mg QHS    glucagon (human recombinant) injection 1 mg  PRN    heparin (porcine) injection 5,000 Units Q8H    hydrALAZINE injection 10 mg Q6H PRN    hydrALAZINE tablet 100 mg Q8H    HYDROcodone-acetaminophen  mg per tablet 1 tablet Q6H PRN    insulin aspart U-100 pen 0-5 Units Q6H PRN    isosorbide mononitrate 24 hr tablet 30 mg Daily    lisinopril tablet 40 mg Daily    loperamide capsule 2 mg BID PRN    nitroGLYCERIN SL tablet 0.4 mg Q5 Min PRN    ondansetron disintegrating tablet 4 mg QID (AC & HS)    ondansetron injection 4 mg Q8H PRN    promethazine (PHENERGAN) 6.25 mg in dextrose 5 % 50 mL IVPB Q6H PRN    silver sulfADIAZINE 1% cream Daily    traMADol tablet 50 mg Q6H PRN       Objective:     Vital Signs (Most Recent):  Temp: 98.3 °F (36.8 °C) (10/30/19 0305)  Pulse: 72 (10/30/19 0305)  Resp: 10 (10/30/19 0305)  BP: (!) 163/74 (10/30/19 0305)  SpO2: 96 % (10/30/19 0305)  O2 Device (Oxygen Therapy): room air (10/30/19 0305) Vital Signs (24h Range):  Temp:  [98.2 °F (36.8 °C)-98.9 °F (37.2 °C)] 98.3 °F (36.8 °C)  Pulse:  [70-84] 72  Resp:  [5-28] 10  SpO2:  [94 %-99 %] 96 %  BP: (111-176)/(56-83) 163/74     Weight: 99.1 kg (218 lb 7.6 oz) (10/28/19 0532)  Body mass index is 32.26 kg/m².  Body surface area is 2.2 meters squared.    I/O last 3 completed shifts:  In: 1957.7 [P.O.:1190; I.V.:267.7; Other:500]  Out: 2000 [Other:2000]    Physical Exam   Constitutional: He is oriented to person, place, and time. He appears well-developed. He is cooperative.   HENT:   Head: Normocephalic.   Nose: No rhinorrhea.   Mouth/Throat: Mucous membranes are normal. No oropharyngeal exudate.   Neck: No thyroid mass present.   Cardiovascular: Normal rate, regular rhythm, S1 normal, S2 normal and intact distal pulses.   Pulmonary/Chest: Effort normal. No respiratory distress. He has no wheezes.   Abdominal: Soft. Bowel sounds are normal. He exhibits no distension. There is tenderness. No hernia.   Musculoskeletal: He exhibits edema.   S/p right BKA.   Trace Left LE  edema.    Lymphadenopathy:     He has no cervical adenopathy.   Neurological: He is alert and oriented to person, place, and time.   Skin: Skin is warm and dry. Rash: .lastre.   Psychiatric: He has a normal mood and affect.       Significant Labs:   Lab Results   Component Value Date    CREATININE 8.1 (H) 10/30/2019    BUN 36 (H) 10/30/2019     10/30/2019    K 4.8 10/30/2019     10/30/2019    CO2 24 10/30/2019     Lab Results   Component Value Date    CALCIUM 8.7 10/30/2019    PHOS 5.4 (H) 10/30/2019     Lab Results   Component Value Date    ALBUMIN 3.0 (L) 10/30/2019     Lab Results   Component Value Date    WBC 5.70 10/30/2019    HGB 7.4 (L) 10/30/2019    HCT 22.7 (L) 10/30/2019    MCV 91 10/30/2019     (L) 10/30/2019               No results for input(s): MG in the last 168 hours.      Significant Imaging:  Imaging Results          X-Ray Chest AP Portable (Final result)  Result time 10/27/19 11:19:22    Final result by Eh Salvador MD (10/27/19 11:19:22)                 Impression:      No acute cardiopulmonary disease.      Electronically signed by: Eh Salvador MD  Date:    10/27/2019  Time:    11:19             Narrative:    EXAMINATION:  XR CHEST AP PORTABLE    CLINICAL HISTORY:  Chest Pain;    TECHNIQUE:  Single frontal view of the chest was performed.    COMPARISON:  None    FINDINGS:  Mild coarsening of bronchovascular markings/COPD.  No pleural effusion or pneumothorax.    The cardiac silhouette is enlarged.  The hilar and mediastinal contours are unremarkable.    Mild thoracic scoliotic curvature convex right with multilevel degenerative changes.                                  Assessment/Plan:     * Hypertensive urgency  Patient was transferred to ICU on 10/28 because of hypertensive urgency. Now off nicardipine drip.   SBP still elevated. Adjust meds to improve BP control.     Anemia of chronic disease  Patient received 1 unit of PRBC on 10/27. Will monitor with repeat CBC. Will  give Epogen with HD.     ESRD (end stage renal disease)  [facility]     Next HD tomorrow.     Access: left arm AVF.     Chest pain  As per hospitalist/Cardiology.         Thank you for your consult. I will follow-up with patient. Please contact us if you have any additional questions.    Phong Titus MD  Nephrology  [facility]

## 2019-10-30 NOTE — ASSESSMENT & PLAN NOTE
Patient on dialyzes on TTS schedule at [facility].     Next HD tomorrow.     Access: left arm AVF.

## 2019-10-30 NOTE — NURSING
"Pt's /78 approx 2.5 hours after morning meds, which included 4 meds specifically for HTN; pt insisted on leaving hospital, even after Dr. Mesa advised against it; pt stated he needed to leave before, "I lose my patients and then things will get really bad;" wife at bedside.  Pt states his pants were not sent along with his other personal belongings from tele room 248 when he was transferred to ICU; this RN went to room 248 and the current patient's family member stated there was a bag of clothes in the closet when they arrived and she gave the bag to the nurse taking care of the pt; charge nurse, CATRACHITA Rayo, stated any belongings left would have been sent to patient relations.  Patient Relations was called.  I spoke with Esperanza Gooden who checked and there were no belongings for the pt.  Pt was informed and given a pair of paper scrubs.    Pt decided to continue with the AMA, but refused to sign AMA document.  Pt was AAOx4 upon leaving and accompanied by wife.  "

## 2019-10-30 NOTE — ASSESSMENT & PLAN NOTE
"-Initial troponin 0.099, continue to trend serial troponin  -Repeat pending  -ECHO pending  -Reports recent PCI in July in [place]  -Recent LHC in [place] and reports "normal" per patient  -NO chest pain on exam today  -BP extremely uncontrolled, medications adjusted today    10/29/19  -Troponin 0.099>0.074  -Trending down  -Chest pain improved since admission  -Needs better BP control  -Wean cardene gtt as tolerated  -BP meds adjusted  -Continue ASA, Plavix, BB, statin    10/30/19  -Continue ASA, Statin, Plavix, Norvasc, Hydralazine, Imdur, ACEi  -BP remains elevated today  -Medical therapy adjusted        "

## 2019-10-30 NOTE — SUBJECTIVE & OBJECTIVE
Interval History: no acute issues noted o/n. BP elevated this AM. No chest pain or SOB today on exam. Would recommend PRBC transfusion of 1 unit today given Hgb 7.4.     Review of Systems   Constitution: Positive for malaise/fatigue.   HENT: Negative for hearing loss and hoarse voice.    Eyes: Negative for blurred vision and visual disturbance.   Cardiovascular: Positive for chest pain (resolved) and dyspnea on exertion. Negative for claudication, irregular heartbeat, leg swelling, near-syncope, orthopnea, palpitations, paroxysmal nocturnal dyspnea and syncope.        S/P PCI in June/July   Respiratory: Positive for shortness of breath. Negative for cough, hemoptysis, sleep disturbances due to breathing, snoring and wheezing.    Endocrine: Negative for cold intolerance and heat intolerance.   Hematologic/Lymphatic: Bruises/bleeds easily.   Skin: Negative for color change, dry skin and nail changes.   Musculoskeletal: Positive for arthritis and back pain. Negative for joint pain and myalgias.        S/p Right BKA   Gastrointestinal: Negative for bloating, abdominal pain, constipation, nausea and vomiting.   Genitourinary: Negative for dysuria, flank pain, hematuria and hesitancy.        +ESRD on HD   Neurological: Negative for headaches, light-headedness, loss of balance, numbness, paresthesias and weakness.   Psychiatric/Behavioral: Negative for altered mental status.   Allergic/Immunologic: Negative for environmental allergies.     Objective:     Vital Signs (Most Recent):  Temp: 98.3 °F (36.8 °C) (10/30/19 0305)  Pulse: 72 (10/30/19 0305)  Resp: 10 (10/30/19 0305)  BP: (!) 163/74 (10/30/19 0305)  SpO2: 96 % (10/30/19 0305) Vital Signs (24h Range):  Temp:  [98.2 °F (36.8 °C)-98.9 °F (37.2 °C)] 98.3 °F (36.8 °C)  Pulse:  [70-81] 72  Resp:  [5-28] 10  SpO2:  [94 %-99 %] 96 %  BP: (111-170)/(56-83) 163/74     Weight: 99.1 kg (218 lb 7.6 oz)  Body mass index is 32.26 kg/m².     SpO2: 96 %  O2 Device (Oxygen Therapy):  room air      Intake/Output Summary (Last 24 hours) at 10/30/2019 0940  Last data filed at 10/30/2019 0300  Gross per 24 hour   Intake 1279.38 ml   Output 2000 ml   Net -720.62 ml       Lines/Drains/Airways     Drain                 Hemodialysis AV Fistula Left upper arm -- days          Peripheral Intravenous Line                 Peripheral IV - Single Lumen 10/27/19 1102 22 G Right Wrist 2 days         Peripheral IV - Single Lumen 10/27/19 1212 20 G Right Forearm 2 days                Physical Exam   Constitutional: He is oriented to person, place, and time. He appears well-developed and well-nourished. No distress.   HENT:   Head: Normocephalic and atraumatic.   Eyes: Pupils are equal, round, and reactive to light.   Neck: Normal range of motion and full passive range of motion without pain. Neck supple. No JVD present.   Cardiovascular: Normal rate, regular rhythm, S1 normal, S2 normal and intact distal pulses. PMI is not displaced. Exam reveals no distant heart sounds.   No murmur heard.  Pulses:       Radial pulses are 2+ on the right side, and 2+ on the left side.        Dorsalis pedis pulses are 2+ on the left side.   Chest pain free on exam  BP elevated today   Pulmonary/Chest: Effort normal and breath sounds normal. No accessory muscle usage. No respiratory distress. He has no decreased breath sounds. He has no wheezes. He has no rales.   Abdominal: Soft. Bowel sounds are normal. He exhibits no distension. There is no tenderness.   Musculoskeletal: Normal range of motion. He exhibits no edema.        Left ankle: He exhibits swelling.   Neurological: He is alert and oriented to person, place, and time.   Skin: Skin is warm and dry. He is not diaphoretic. No cyanosis. Nails show no clubbing.   Psychiatric: He has a normal mood and affect. His speech is normal and behavior is normal. Judgment and thought content normal. Cognition and memory are normal.   Nursing note and vitals reviewed.      Significant  Labs:   BMP:   Recent Labs   Lab 10/28/19  1045 10/29/19  0330 10/30/19  0339   * 136* 160*    138 138   K 4.7 4.4 4.8   CL 97 98 103   CO2 23 24 24   BUN 63* 72* 36*   CREATININE 11.4* 12.7* 8.1*   CALCIUM 9.5 9.0 8.7   , CBC   Recent Labs   Lab 10/28/19  1000  10/29/19  0330 10/29/19  0934 10/30/19  0339   WBC 6.17  --  5.59  --  5.70   HGB 8.5*  8.5*   < > 7.5* 7.6* 7.4*   HCT 26.3*  26.3*   < > 23.3* 23.3* 22.7*     --  163  --  140*    < > = values in this interval not displayed.   , Troponin   Recent Labs   Lab 10/28/19  1000   TROPONINI 0.074*    and All pertinent lab results from the last 24 hours have been reviewed.    Significant Imaging: Echocardiogram:   2D echo with color flow doppler:   Results for orders placed or performed during the hospital encounter of 10/27/19   2D echo with color flow doppler   Result Value Ref Range    QEF 55 55 - 65    Diastolic Dysfunction Yes (A)     Mitral Valve Mobility NORMAL     Narrative    Date of Procedure: 10/28/2019        TEST DESCRIPTION   Technical Quality: This is a technically challenging study. There is poor endocardial definition.     Aorta: The aortic root is normal in size, measuring 2.5 cm at sinotubular junction and 2.6 cm at Sinuses of Valsalva. The proximal ascending aorta is normal in size, measuring 2.6 cm across.     Left Atrium: The left atrial volume index is severely enlarged, measuring 56.08 cc/m2.     Left Ventricle: The left ventricle is normal in size, with an end-diastolic diameter of 5.2 cm, and an end-systolic diameter of 3.8 cm. LV wall thickness is normal, with the septum measuring 1.2 cm and the posterior wall measuring 1.4 cm across. Relative   wall thickness was increased at 0.54, and the LV mass index was increased at 155.9 g/m2 consistent with concentric left ventricular hypertrophy. There are no regional wall motion abnormalities. Left ventricular systolic function appears normal. Visually   estimated ejection  fraction is 55-60%. The LV Doppler derived stroke volume equals 59.0 ccs.     Diastolic indices: E wave velocity 1.3 m/s, E/A ratio 1.6,  msec., E/e' ratio(avg) 13. There is pseudonormalization of mitral inflow pattern consistent with significant diastolic dysfunction.     Right Atrium: The right atrium is normal in size, measuring 5.0 cm in length and 3.8 cm in width in the apical view.     Right Ventricle: The right ventricle is normal in size. Global right ventricular systolic function appears normal. Tricuspid annular plane systolic excursion (TAPSE) is 2.0 cm.     Aortic Valve:  The aortic valve is normal in structure with normal leaflet mobility. The mean gradient obtained across the aortic valve is 5 mmHg.     Mitral Valve:  The mitral valve is normal in structure with normal leaflet mobility. The pressure half time is 30 msec. The calculated mitral valve area is 7.33 cm2.     Tricuspid Valve:  The tricuspid valve is normal in structure with normal leaflet mobility.     Pulmonary Valve:  The pulmonic valve is not well seen. There is mild pulmonic regurgitation.     IVC: IVC is normal in size and collapses > 50% with a sniff, suggesting normal right atrial pressure of 3 mmHg.     Intracavitary: There is no evidence of pericardial effusion, intracavity mass, thrombi, or vegetation.         CONCLUSIONS     1 - Normal left ventricular systolic function (EF 55-60%).     2 - Impaired LV relaxation, elevated LAP (grade 2 diastolic dysfunction).     3 - No wall motion abnormalities.     4 - Concentric hypertrophy.     5 - Severe left atrial enlargement.     6 - Normal right ventricular systolic function .     7 - Mild pulmonic regurgitation.             This document has been electronically    SIGNED BY: Jose Elias Ambrocio MD On: 10/28/2019 11:42    and X-Ray: CXR: X-Ray Chest 1 View (CXR): No results found for this visit on 10/27/19. and X-Ray Chest PA and Lateral (CXR): No results found for this visit on  10/27/19.

## 2019-10-30 NOTE — PLAN OF CARE
10/30/19 1441   Final Note   Assessment Type Final Discharge Note   Anticipated Discharge Disposition Left Against   Right Care Referral Info   Post Acute Recommendation No Care

## 2019-10-30 NOTE — ASSESSMENT & PLAN NOTE
-H/H 7.5/23.3  -Transfusion planned for today    10/30  -Hgb 7.4  -Recommend Hem/Onc consult  -Recommend PRBC transfusion today

## 2019-10-30 NOTE — SUBJECTIVE & OBJECTIVE
Interval History:     10/28/19: patient reports intermittent nausea and abdominal pain.     10/29/19: patient was transferred to ICU overnight because of hypertensive urgency. He reports mild chest tightness, no other issues at present.     10/30/19: Patient is feeling OK, he denies any complaints.             Review of patient's allergies indicates:   Allergen Reactions    Morphine      Current Facility-Administered Medications   Medication Frequency    0.9%  NaCl infusion PRN    acetaminophen tablet 650 mg Q6H PRN    amLODIPine tablet 10 mg Daily    aspirin EC tablet 81 mg Daily    atorvastatin tablet 80 mg QHS    carvedilol tablet 25 mg BID WM    cloNIDine 0.3 mg/24 hr td ptwk 1 patch Q7 Days    clopidogrel tablet 75 mg Daily    dextrose 10% (D10W) Bolus PRN    epoetin barbara-epbx injection 10,000 Units PRN    famotidine tablet 20 mg Daily    gabapentin capsule 300 mg QHS    glucagon (human recombinant) injection 1 mg PRN    heparin (porcine) injection 5,000 Units Q8H    hydrALAZINE injection 10 mg Q6H PRN    hydrALAZINE tablet 100 mg Q8H    HYDROcodone-acetaminophen  mg per tablet 1 tablet Q6H PRN    insulin aspart U-100 pen 0-5 Units Q6H PRN    isosorbide mononitrate 24 hr tablet 30 mg Daily    lisinopril tablet 40 mg Daily    loperamide capsule 2 mg BID PRN    nitroGLYCERIN SL tablet 0.4 mg Q5 Min PRN    ondansetron disintegrating tablet 4 mg QID (AC & HS)    ondansetron injection 4 mg Q8H PRN    promethazine (PHENERGAN) 6.25 mg in dextrose 5 % 50 mL IVPB Q6H PRN    silver sulfADIAZINE 1% cream Daily    traMADol tablet 50 mg Q6H PRN       Objective:     Vital Signs (Most Recent):  Temp: 98.3 °F (36.8 °C) (10/30/19 0305)  Pulse: 72 (10/30/19 0305)  Resp: 10 (10/30/19 0305)  BP: (!) 163/74 (10/30/19 0305)  SpO2: 96 % (10/30/19 0305)  O2 Device (Oxygen Therapy): room air (10/30/19 0305) Vital Signs (24h Range):  Temp:  [98.2 °F (36.8 °C)-98.9 °F (37.2 °C)] 98.3 °F (36.8 °C)  Pulse:   [70-84] 72  Resp:  [5-28] 10  SpO2:  [94 %-99 %] 96 %  BP: (111-176)/(56-83) 163/74     Weight: 99.1 kg (218 lb 7.6 oz) (10/28/19 0532)  Body mass index is 32.26 kg/m².  Body surface area is 2.2 meters squared.    I/O last 3 completed shifts:  In: 1957.7 [P.O.:1190; I.V.:267.7; Other:500]  Out: 2000 [Other:2000]    Physical Exam   Constitutional: He is oriented to person, place, and time. He appears well-developed. He is cooperative.   HENT:   Head: Normocephalic.   Nose: No rhinorrhea.   Mouth/Throat: Mucous membranes are normal. No oropharyngeal exudate.   Neck: No thyroid mass present.   Cardiovascular: Normal rate, regular rhythm, S1 normal, S2 normal and intact distal pulses.   Pulmonary/Chest: Effort normal. No respiratory distress. He has no wheezes.   Abdominal: Soft. Bowel sounds are normal. He exhibits no distension. There is tenderness. No hernia.   Musculoskeletal: He exhibits edema.   S/p right BKA.   Trace Left LE edema.    Lymphadenopathy:     He has no cervical adenopathy.   Neurological: He is alert and oriented to person, place, and time.   Skin: Skin is warm and dry. Rash: .lastre.   Psychiatric: He has a normal mood and affect.       Significant Labs:   Lab Results   Component Value Date    CREATININE 8.1 (H) 10/30/2019    BUN 36 (H) 10/30/2019     10/30/2019    K 4.8 10/30/2019     10/30/2019    CO2 24 10/30/2019     Lab Results   Component Value Date    CALCIUM 8.7 10/30/2019    PHOS 5.4 (H) 10/30/2019     Lab Results   Component Value Date    ALBUMIN 3.0 (L) 10/30/2019     Lab Results   Component Value Date    WBC 5.70 10/30/2019    HGB 7.4 (L) 10/30/2019    HCT 22.7 (L) 10/30/2019    MCV 91 10/30/2019     (L) 10/30/2019               No results for input(s): MG in the last 168 hours.      Significant Imaging:  Imaging Results          X-Ray Chest AP Portable (Final result)  Result time 10/27/19 11:19:22    Final result by Eh Salvador MD (10/27/19 11:19:22)                  Impression:      No acute cardiopulmonary disease.      Electronically signed by: Eh Salvador MD  Date:    10/27/2019  Time:    11:19             Narrative:    EXAMINATION:  XR CHEST AP PORTABLE    CLINICAL HISTORY:  Chest Pain;    TECHNIQUE:  Single frontal view of the chest was performed.    COMPARISON:  None    FINDINGS:  Mild coarsening of bronchovascular markings/COPD.  No pleural effusion or pneumothorax.    The cardiac silhouette is enlarged.  The hilar and mediastinal contours are unremarkable.    Mild thoracic scoliotic curvature convex right with multilevel degenerative changes.

## 2019-10-30 NOTE — DISCHARGE SUMMARY
"[facility]  Hospital Medicine  Discharge Summary      Patient Name: Brody Robins  MRN: [id number]  Admission Date: 10/27/2019  Hospital Length of Stay: 2 days  Discharge Date and Time: 10/30/2019 12:10 PM  Attending Physician: Marilynn att. providers found   Discharging Provider: Ankit Mesa MD  Primary Care Provider: Primary Doctor No      HPI:   Pt is a 43 yo male with PMHx of ESRD on HD (dialyzes M,W,F), Diabetes, gastroparesis and CAD. He states he had placement of a coronary stent in July, 2019 and a recent heart cath approx 4 months. The pt states yesterday he started vomiting multiple episodes of greenish emesis. He is able to take his medications but has vomited everything. Also, pt describes chest pain described as stabbing and rates "8" (1-10) that radiates into his left arm and left leg. He denies any SOB but has noted some dizziness and lightheadedness. He denies fever, chills and states he has "diabetic stomach."  He is visiting here and last had dialysis on Friday at [facility] in [place]. Vital signs on arrival Temp 98.4, pulse 97, resp 18, B/P 213/101 and SpO2 = 100 %. Labs show Hgb 7.3/Hct 22.1, platelets 140, anion gap 17, BUN 51, creatinine 10.0, glucose 240, BNP = 2.734 and troponin 0.099. CXR shows Mild coarsening of bronchovascular markings/COPD.  Also, enlarged cardiac silhouette. Pt is placed on Observation due to anemia and further evaluation of chest pain and likely gastroparesis.         * No surgery found *      Hospital Course:   Mr Robins is a 44 year old who presented to [facility] on yesterday with complaints of multiple episodes of vomiting. Associated symptoms include chest pain radiating to left arm and leg. He reports having Left Heart Cath with stent placement in July 2019. Cardiology consulted, he was started back on ASA, Plavix and blood pressure medications. Blood pressure continued to be elevated after continued treatment. He also was experiencing chest pain radiating to " left arm. Case reviewed with Cardiology and patient was transferred to ICU and started on Tridil drip.     Patient was admitted into ICU for cardene drip. He was transitioned to PO BP meds. On day of possible discharge his bp remained elevated ranging from 178-190s systolic. This was discussed with patient that we need to better optimize his meds for better BP control. Patient adamant he wanted to leave.      Consults:   Consults (From admission, onward)        Status Ordering Provider     Inpatient consult to Cardiology  Once     Provider:  Jose Elias Ambrocio MD    Completed LEONARDO RIGGINS     Inpatient consult to Pulmonology  Once     Provider:  (Not yet assigned)    Completed NU ALONZO          No new Assessment & Plan notes have been filed under this hospital service since the last note was generated.  Service: Hospital Medicine    Final Active Diagnoses:    Diagnosis Date Noted POA    PRINCIPAL PROBLEM:  Hypertensive urgency [I16.0] 10/27/2019 Yes    Hypertensive urgency, malignant [I16.0] 10/29/2019 Yes    Coronary artery disease of native artery of native heart with stable angina pectoris [I25.118] 10/28/2019 Yes    Chest pain [R07.9] 10/27/2019 Yes    ESRD (end stage renal disease) [N18.6] 10/27/2019 Yes    Diabetes mellitus, type II [E11.9] 10/27/2019 Yes    Anemia of chronic disease [D63.8] 10/27/2019 Yes    Gastroparesis [K31.84] 10/27/2019 Yes      Problems Resolved During this Admission:       Discharged Condition: against medical advice    Disposition: Left Against Medical Adv*    Follow Up:    Patient Instructions:   No discharge procedures on file.    Significant Diagnostic Studies:     Pending Diagnostic Studies:     None         Medications:      Indwelling Lines/Drains at time of discharge:   Lines/Drains/Airways     Drain                 Hemodialysis AV Fistula Left upper arm -- days                Time spent on the discharge of patient: 31 minutes  Patient was seen and examined on the date of  discharge and determined to be suitable for discharge.        Ankit Mesa MD  Department of Hospital Medicine  [facility]

## 2024-05-12 NOTE — ASSESSMENT & PLAN NOTE
Prn analgesia  Limit prn opiate analgesia due to contraindication - slow of GI motility  Prn antiemetics     Yes